# Patient Record
Sex: MALE | Race: WHITE | NOT HISPANIC OR LATINO | URBAN - METROPOLITAN AREA
[De-identification: names, ages, dates, MRNs, and addresses within clinical notes are randomized per-mention and may not be internally consistent; named-entity substitution may affect disease eponyms.]

---

## 2017-03-14 ENCOUNTER — HISTORICAL (OUTPATIENT)
Dept: ADMINISTRATIVE | Facility: HOSPITAL | Age: 67
End: 2017-03-14

## 2017-04-11 ENCOUNTER — HISTORICAL (OUTPATIENT)
Dept: INTENSIVE CARE | Facility: HOSPITAL | Age: 67
End: 2017-04-11

## 2017-08-10 ENCOUNTER — HISTORICAL (OUTPATIENT)
Dept: INTENSIVE CARE | Facility: HOSPITAL | Age: 67
End: 2017-08-10

## 2018-03-09 ENCOUNTER — HISTORICAL (OUTPATIENT)
Dept: ADMINISTRATIVE | Facility: HOSPITAL | Age: 68
End: 2018-03-09

## 2018-09-06 ENCOUNTER — HISTORICAL (OUTPATIENT)
Dept: ADMINISTRATIVE | Facility: HOSPITAL | Age: 68
End: 2018-09-06

## 2018-09-06 LAB
ALBUMIN SERPL-MCNC: 4.2 GM/DL (ref 3.4–5)
ALBUMIN/GLOB SERPL: 1.45 {RATIO} (ref 1.5–2.5)
ALP SERPL-CCNC: 99 UNIT/L (ref 38–126)
ALT SERPL-CCNC: 20 UNIT/L (ref 7–52)
AST SERPL-CCNC: 27 UNIT/L (ref 15–37)
BILIRUB SERPL-MCNC: 0.4 MG/DL (ref 0.2–1)
BILIRUBIN DIRECT+TOT PNL SERPL-MCNC: 0.1 MG/DL (ref 0–0.5)
BILIRUBIN DIRECT+TOT PNL SERPL-MCNC: 0.3 MG/DL
BUN SERPL-MCNC: 10 MG/DL (ref 7–18)
CALCIUM SERPL-MCNC: 8.9 MG/DL (ref 8.5–10)
CHLORIDE SERPL-SCNC: 92 MMOL/L (ref 98–107)
CO2 SERPL-SCNC: 30 MMOL/L (ref 21–32)
CREAT SERPL-MCNC: 0.69 MG/DL (ref 0.6–1.3)
DEPRECATED CALCIDIOL+CALCIFEROL SERPL-MC: 61.6 NG/ML (ref 30–80)
GLOBULIN SER-MCNC: 2.9 GM/DL (ref 1.2–3)
GLUCOSE SERPL-MCNC: 91 MG/DL (ref 74–106)
POTASSIUM SERPL-SCNC: 4.3 MMOL/L (ref 3.5–5.1)
PROT SERPL-MCNC: 7.1 GM/DL (ref 6.4–8.2)
SODIUM SERPL-SCNC: 129 MMOL/L (ref 136–145)

## 2018-09-19 ENCOUNTER — HISTORICAL (OUTPATIENT)
Dept: ADMINISTRATIVE | Facility: HOSPITAL | Age: 68
End: 2018-09-19

## 2018-09-19 LAB
BUN SERPL-MCNC: 11 MG/DL (ref 7–18)
CALCIUM SERPL-MCNC: 9.2 MG/DL (ref 8.5–10)
CHLORIDE SERPL-SCNC: 89 MMOL/L (ref 98–107)
CO2 SERPL-SCNC: 30 MMOL/L (ref 21–32)
CREAT SERPL-MCNC: 0.66 MG/DL (ref 0.6–1.3)
CREAT/UREA NIT SERPL: 16.7
GLUCOSE SERPL-MCNC: 91 MG/DL (ref 74–106)
POTASSIUM SERPL-SCNC: 4.4 MMOL/L (ref 3.5–5.1)
SODIUM SERPL-SCNC: 126 MMOL/L (ref 136–145)

## 2018-09-28 ENCOUNTER — HISTORICAL (OUTPATIENT)
Dept: ADMINISTRATIVE | Facility: HOSPITAL | Age: 68
End: 2018-09-28

## 2018-09-28 ENCOUNTER — HISTORICAL (OUTPATIENT)
Dept: LAB | Facility: HOSPITAL | Age: 68
End: 2018-09-28

## 2018-09-28 LAB
ALBUMIN SERPL-MCNC: 4.1 GM/DL (ref 3.4–5)
ALBUMIN/GLOB SERPL: 1.52 {RATIO} (ref 1.5–2.5)
ALP SERPL-CCNC: 94 UNIT/L (ref 38–126)
ALT SERPL-CCNC: 17 UNIT/L (ref 7–52)
AST SERPL-CCNC: 26 UNIT/L (ref 15–37)
BILIRUB SERPL-MCNC: 0.3 MG/DL (ref 0.2–1)
BILIRUBIN DIRECT+TOT PNL SERPL-MCNC: 0.1 MG/DL (ref 0–0.5)
BILIRUBIN DIRECT+TOT PNL SERPL-MCNC: 0.2 MG/DL
BUN SERPL-MCNC: 16 MG/DL (ref 7–18)
CALCIUM SERPL-MCNC: 9.2 MG/DL (ref 8.5–10)
CHLORIDE SERPL-SCNC: 96 MMOL/L (ref 98–107)
CO2 SERPL-SCNC: 27 MMOL/L (ref 21–32)
CREAT SERPL-MCNC: 0.75 MG/DL (ref 0.6–1.3)
GLOBULIN SER-MCNC: 2.7 GM/DL (ref 1.2–3)
GLUCOSE SERPL-MCNC: 84 MG/DL (ref 74–106)
OSMOLALITY SERPL: 286 MOSM/KG (ref 280–300)
OSMOLALITY UR: 819 MOSM/KG (ref 300–1300)
POTASSIUM SERPL-SCNC: 4.5 MMOL/L (ref 3.5–5.1)
PROT SERPL-MCNC: 6.8 GM/DL (ref 6.4–8.2)
SODIUM SERPL-SCNC: 137 MMOL/L (ref 136–145)
SODIUM UR-SCNC: 98 MMOL/L

## 2019-03-22 ENCOUNTER — HISTORICAL (OUTPATIENT)
Dept: LAB | Facility: HOSPITAL | Age: 69
End: 2019-03-22

## 2019-03-22 LAB
ABS NEUT (OLG): 3.16 X10(3)/MCL (ref 2.1–9.2)
ALBUMIN SERPL-MCNC: 3.5 GM/DL (ref 3.4–5)
ALBUMIN/GLOB SERPL: 1.2 RATIO (ref 1.1–2)
ALP SERPL-CCNC: 117 UNIT/L (ref 50–136)
ALT SERPL-CCNC: 28 UNIT/L (ref 12–78)
APPEARANCE, UA: NORMAL
AST SERPL-CCNC: 31 UNIT/L (ref 15–37)
BACTERIA #/AREA URNS AUTO: NORMAL /HPF
BASOPHILS NFR BLD MANUAL: 4 % (ref 0–2)
BILIRUB SERPL-MCNC: 0.2 MG/DL (ref 0.2–1)
BILIRUB UR QL STRIP: NEGATIVE
BILIRUBIN DIRECT+TOT PNL SERPL-MCNC: 0.1 MG/DL (ref 0–0.5)
BILIRUBIN DIRECT+TOT PNL SERPL-MCNC: 0.1 MG/DL (ref 0–0.8)
BUN SERPL-MCNC: 13 MG/DL (ref 7–18)
CALCIUM SERPL-MCNC: 8.5 MG/DL (ref 8.5–10.1)
CHLORIDE SERPL-SCNC: 99 MMOL/L (ref 98–107)
CHOLEST SERPL-MCNC: 187 MG/DL (ref 0–200)
CHOLEST/HDLC SERPL: 2.2 {RATIO} (ref 0–5)
CO2 SERPL-SCNC: 33 MMOL/L (ref 21–32)
COLOR UR: NORMAL
CREAT SERPL-MCNC: 0.66 MG/DL (ref 0.7–1.3)
DEPRECATED CALCIDIOL+CALCIFEROL SERPL-MC: 59.51 NG/ML (ref 30–80)
EOSINOPHIL NFR BLD MANUAL: 2 % (ref 0–8)
ERYTHROCYTE [DISTWIDTH] IN BLOOD BY AUTOMATED COUNT: 13.5 % (ref 11.5–17)
GLOBULIN SER-MCNC: 3 GM/DL (ref 2.4–3.5)
GLUCOSE (UA): NEGATIVE
GLUCOSE SERPL-MCNC: 71 MG/DL (ref 74–106)
HCT VFR BLD AUTO: 39.3 % (ref 42–52)
HDLC SERPL-MCNC: 84 MG/DL (ref 35–60)
HGB BLD-MCNC: 12.6 GM/DL (ref 14–18)
HGB UR QL STRIP: NEGATIVE
KETONES UR QL STRIP: NEGATIVE
LDLC SERPL CALC-MCNC: 83 MG/DL (ref 0–129)
LEUKOCYTE ESTERASE UR QL STRIP: NEGATIVE
LYMPHOCYTES NFR BLD MANUAL: 16 % (ref 13–40)
MCH RBC QN AUTO: 30 PG (ref 27–31)
MCHC RBC AUTO-ENTMCNC: 32.1 GM/DL (ref 33–36)
MCV RBC AUTO: 93.6 FL (ref 80–94)
MONOCYTES NFR BLD MANUAL: 10 % (ref 2–11)
NEUTROPHILS NFR BLD MANUAL: 68 % (ref 47–80)
NITRITE UR QL STRIP.AUTO: NEGATIVE
PH UR STRIP: 8.5 [PH] (ref 5–9)
PLATELET # BLD AUTO: 297 X10(3)/MCL (ref 130–400)
PLATELET # BLD EST: NORMAL 10*3/UL
PMV BLD AUTO: 10.4 FL (ref 7.4–10.4)
POTASSIUM SERPL-SCNC: 4.4 MMOL/L (ref 3.5–5.1)
PROT SERPL-MCNC: 6.5 GM/DL (ref 6.4–8.2)
PROT UR QL STRIP: NEGATIVE
PSA SERPL-MCNC: 4.4 NG/ML (ref 0–4)
RBC # BLD AUTO: 4.2 X10(6)/MCL (ref 4.7–6.1)
RBC #/AREA URNS HPF: NORMAL /[HPF]
SODIUM SERPL-SCNC: 137 MMOL/L (ref 136–145)
SP GR UR STRIP: 1.02 (ref 1–1.03)
SQUAMOUS EPITHELIAL, UA: NORMAL
TRIGL SERPL-MCNC: 102 MG/DL (ref 30–150)
TSH SERPL-ACNC: 1.45 MIU/L (ref 0.36–3.74)
UROBILINOGEN UR STRIP-ACNC: 1
VLDLC SERPL CALC-MCNC: 20 MG/DL
WBC # SPEC AUTO: 4.9 X10(3)/MCL (ref 4.5–11.5)
WBC #/AREA URNS AUTO: NORMAL /[HPF]

## 2019-04-25 ENCOUNTER — HISTORICAL (OUTPATIENT)
Dept: ADMINISTRATIVE | Facility: HOSPITAL | Age: 69
End: 2019-04-25

## 2019-04-25 LAB
APPEARANCE, UA: ABNORMAL
BACTERIA #/AREA URNS AUTO: ABNORMAL /HPF
BILIRUB UR QL STRIP: NEGATIVE MG/DL
COLOR UR: YELLOW
GLUCOSE (UA): NEGATIVE MG/DL
HGB UR QL STRIP: NEGATIVE UNIT/L
KETONES UR QL STRIP: NEGATIVE MG/DL
LEUKOCYTE ESTERASE UR QL STRIP: NEGATIVE UNIT/L
NITRITE UR QL STRIP.AUTO: NEGATIVE
PH UR STRIP: 8.5 [PH]
PROT UR QL STRIP: NEGATIVE MG/DL
PSA SERPL-MCNC: 3.15 NG/ML (ref 0–4.5)
RBC #/AREA URNS HPF: ABNORMAL /HPF
SP GR UR STRIP: 1.01
SQUAMOUS EPITHELIAL, UA: ABNORMAL /LPF
UROBILINOGEN UR STRIP-ACNC: 0.2 MG/DL
WBC #/AREA URNS AUTO: ABNORMAL /[HPF]

## 2019-07-10 ENCOUNTER — HISTORICAL (OUTPATIENT)
Dept: ADMINISTRATIVE | Facility: HOSPITAL | Age: 69
End: 2019-07-10

## 2019-07-10 ENCOUNTER — HISTORICAL (OUTPATIENT)
Dept: LAB | Facility: HOSPITAL | Age: 69
End: 2019-07-10

## 2019-07-10 LAB
ALBUMIN SERPL-MCNC: 3.9 GM/DL (ref 3.4–5)
ALBUMIN/GLOB SERPL: 1.44 {RATIO} (ref 1.5–2.5)
ALP SERPL-CCNC: 84 UNIT/L (ref 38–126)
ALT SERPL-CCNC: 18 UNIT/L (ref 7–52)
AST SERPL-CCNC: 28 UNIT/L (ref 15–37)
BILIRUB SERPL-MCNC: 0.3 MG/DL (ref 0.2–1)
BILIRUBIN DIRECT+TOT PNL SERPL-MCNC: 0.1 MG/DL
BILIRUBIN DIRECT+TOT PNL SERPL-MCNC: 0.2 MG/DL (ref 0–0.5)
BUN SERPL-MCNC: 13 MG/DL (ref 7–18)
CALCIUM SERPL-MCNC: 8.9 MG/DL (ref 8.5–10)
CHLORIDE SERPL-SCNC: 99 MMOL/L (ref 98–107)
CO2 SERPL-SCNC: 29 MMOL/L (ref 21–32)
CREAT SERPL-MCNC: 0.7 MG/DL (ref 0.6–1.3)
GLOBULIN SER-MCNC: 2.7 GM/DL (ref 1.2–3)
GLUCOSE SERPL-MCNC: 80 MG/DL (ref 74–106)
POTASSIUM SERPL-SCNC: 4.3 MMOL/L (ref 3.5–5.1)
PROT SERPL-MCNC: 6.6 GM/DL (ref 6.4–8.2)
SODIUM SERPL-SCNC: 136 MMOL/L (ref 136–145)

## 2019-08-28 ENCOUNTER — HISTORICAL (OUTPATIENT)
Dept: ADMINISTRATIVE | Facility: HOSPITAL | Age: 69
End: 2019-08-28

## 2019-08-28 LAB
BUN SERPL-MCNC: 14 MG/DL (ref 7–18)
CALCIUM SERPL-MCNC: 8.8 MG/DL (ref 8.5–10.1)
CHLORIDE SERPL-SCNC: 98 MMOL/L (ref 98–107)
CO2 SERPL-SCNC: 33 MMOL/L (ref 21–32)
CREAT SERPL-MCNC: 0.64 MG/DL (ref 0.7–1.3)
CREAT/UREA NIT SERPL: 21.9
ERYTHROCYTE [DISTWIDTH] IN BLOOD BY AUTOMATED COUNT: 13.3 % (ref 11.5–17)
GLUCOSE SERPL-MCNC: 78 MG/DL (ref 74–106)
HCT VFR BLD AUTO: 35.8 % (ref 42–52)
HGB BLD-MCNC: 11.7 GM/DL (ref 14–18)
MCH RBC QN AUTO: 30.7 PG (ref 27–31)
MCHC RBC AUTO-ENTMCNC: 32.7 GM/DL (ref 33–36)
MCV RBC AUTO: 94 FL (ref 80–94)
PLATELET # BLD AUTO: 312 X10(3)/MCL (ref 130–400)
PMV BLD AUTO: 9.4 FL (ref 9.4–12.4)
POTASSIUM SERPL-SCNC: 4.1 MMOL/L (ref 3.5–5.1)
RBC # BLD AUTO: 3.81 X10(6)/MCL (ref 4.7–6.1)
SODIUM SERPL-SCNC: 136 MMOL/L (ref 136–145)
WBC # SPEC AUTO: 5 X10(3)/MCL (ref 4.5–11.5)

## 2019-12-10 ENCOUNTER — HISTORICAL (OUTPATIENT)
Dept: RADIOLOGY | Facility: HOSPITAL | Age: 69
End: 2019-12-10

## 2020-02-05 ENCOUNTER — HISTORICAL (OUTPATIENT)
Dept: LAB | Facility: HOSPITAL | Age: 70
End: 2020-02-05

## 2020-02-05 LAB
ABS NEUT (OLG): 3.64 X10(3)/MCL (ref 2.1–9.2)
ALBUMIN SERPL-MCNC: 3.4 GM/DL (ref 3.4–5)
ALBUMIN/GLOB SERPL: 1.1 {RATIO}
ALP SERPL-CCNC: 102 UNIT/L (ref 50–136)
ALT SERPL-CCNC: 32 UNIT/L (ref 12–78)
AST SERPL-CCNC: 27 UNIT/L (ref 15–37)
BASOPHILS NFR BLD MANUAL: 1 % (ref 0–2)
BILIRUB SERPL-MCNC: 0.2 MG/DL (ref 0.2–1)
BILIRUBIN DIRECT+TOT PNL SERPL-MCNC: 0.1 MG/DL (ref 0–0.2)
BILIRUBIN DIRECT+TOT PNL SERPL-MCNC: 0.1 MG/DL (ref 0–0.8)
BUN SERPL-MCNC: 15 MG/DL (ref 7–18)
CALCIUM SERPL-MCNC: 8.7 MG/DL (ref 8.5–10.1)
CHLORIDE SERPL-SCNC: 96 MMOL/L (ref 98–107)
CO2 SERPL-SCNC: 32 MMOL/L (ref 21–32)
CREAT SERPL-MCNC: 0.62 MG/DL (ref 0.7–1.3)
ERYTHROCYTE [DISTWIDTH] IN BLOOD BY AUTOMATED COUNT: 13.4 % (ref 11.5–17)
GLOBULIN SER-MCNC: 3 GM/DL (ref 2.4–3.5)
GLUCOSE SERPL-MCNC: 68 MG/DL (ref 74–106)
HCT VFR BLD AUTO: 38 % (ref 42–52)
HGB BLD-MCNC: 11.9 GM/DL (ref 14–18)
LYMPHOCYTES NFR BLD MANUAL: 19 % (ref 13–40)
MCH RBC QN AUTO: 29.9 PG (ref 27–31)
MCHC RBC AUTO-ENTMCNC: 31.3 GM/DL (ref 33–36)
MCV RBC AUTO: 95.5 FL (ref 80–94)
MONOCYTES NFR BLD MANUAL: 10 % (ref 2–11)
NEUTROPHILS NFR BLD MANUAL: 70 % (ref 47–80)
PLATELET # BLD AUTO: 283 X10(3)/MCL (ref 130–400)
PLATELET # BLD EST: NORMAL 10*3/UL
PMV BLD AUTO: 10 FL (ref 7.4–10.4)
POTASSIUM SERPL-SCNC: 4.3 MMOL/L (ref 3.5–5.1)
PROT SERPL-MCNC: 6.4 GM/DL (ref 6.4–8.2)
RBC # BLD AUTO: 3.98 X10(6)/MCL (ref 4.7–6.1)
RBC MORPH BLD: NORMAL
SODIUM SERPL-SCNC: 132 MMOL/L (ref 136–145)
WBC # SPEC AUTO: 5.6 X10(3)/MCL (ref 4.5–11.5)

## 2020-10-01 LAB
APTT PPP: 36.3 SECOND(S) (ref 23.2–33.7)
BUN SERPL-MCNC: 28 MG/DL (ref 7–18)
CALCIUM SERPL-MCNC: 8.2 MG/DL (ref 8.5–10.1)
CHLORIDE SERPL-SCNC: 97 MMOL/L (ref 98–107)
CO2 SERPL-SCNC: 32.3 MMOL/L (ref 21–32)
CREAT SERPL-MCNC: 0.6 MG/DL (ref 0.6–1.3)
CREAT/UREA NIT SERPL: 47 MG/DL (ref 12–14)
EST. AVERAGE GLUCOSE BLD GHB EST-MCNC: 94 MG/DL
GLUCOSE SERPL-MCNC: 95 MG/DL (ref 74–106)
HBA1C MFR BLD: 4.9 % (ref 4.5–6.2)
INR PPP: 1.2 (ref 0–1.3)
POTASSIUM SERPL-SCNC: 4.5 MMOL/L (ref 3.5–5.1)
PREALB SERPL-MCNC: 25.2 MG/DL (ref 18–35.7)
PROTHROMBIN TIME: 14.8 SECOND(S) (ref 11.1–13.7)
SODIUM SERPL-SCNC: 132 MMOL/L (ref 136–145)

## 2020-10-03 ENCOUNTER — HISTORICAL (OUTPATIENT)
Dept: ADMINISTRATIVE | Facility: HOSPITAL | Age: 70
End: 2020-10-03

## 2020-10-03 LAB
ABS NEUT (OLG): 3.36 X10(3)/MCL (ref 2.1–9.2)
ALBUMIN SERPL-MCNC: 2.9 GM/DL (ref 3.4–5)
ALBUMIN/GLOB SERPL: 1.1 RATIO (ref 1.1–2)
ALP SERPL-CCNC: 101 UNIT/L (ref 46–116)
ALT SERPL-CCNC: 47 UNIT/L (ref 12–78)
AST SERPL-CCNC: 35 UNIT/L (ref 15–37)
BILIRUB SERPL-MCNC: 0.4 MG/DL (ref 0.2–1)
BILIRUBIN DIRECT+TOT PNL SERPL-MCNC: 0.19 MG/DL (ref 0–0.2)
BILIRUBIN DIRECT+TOT PNL SERPL-MCNC: 0.21 MG/DL (ref 0–0.8)
BUN SERPL-MCNC: 26.2 MG/DL (ref 7–18)
CALCIUM SERPL-MCNC: 8.5 MG/DL (ref 8.5–10.1)
CHLORIDE SERPL-SCNC: 96 MMOL/L (ref 98–107)
CO2 SERPL-SCNC: 28.2 MMOL/L (ref 21–32)
CREAT SERPL-MCNC: 0.48 MG/DL (ref 0.6–1.3)
EOSINOPHIL # BLD AUTO: 0 X10(3)/MCL (ref 0–0.9)
EOSINOPHIL NFR BLD AUTO: 0 %
ERYTHROCYTE [DISTWIDTH] IN BLOOD BY AUTOMATED COUNT: 11.9 % (ref 11.5–17)
GLOBULIN SER-MCNC: 2.7 GM/DL (ref 2.4–3.5)
GLUCOSE SERPL-MCNC: 85 MG/DL (ref 74–106)
HCT VFR BLD AUTO: 30.7 % (ref 42–52)
HGB BLD-MCNC: 11.1 GM/DL (ref 14–18)
IMM GRANULOCYTES # BLD AUTO: 0.07 % (ref 0–0.02)
IMM GRANULOCYTES NFR BLD AUTO: 1.4 % (ref 0–0.43)
LYMPHOCYTES # BLD AUTO: 0.9 X10(3)/MCL (ref 0.6–4.6)
LYMPHOCYTES NFR BLD AUTO: 18 %
MCH RBC QN AUTO: 32.4 PG (ref 27–31)
MCHC RBC AUTO-ENTMCNC: 36.2 GM/DL (ref 33–36)
MCV RBC AUTO: 89.5 FL (ref 80–94)
MONOCYTES # BLD AUTO: 0.8 X10(3)/MCL (ref 0.1–1.3)
MONOCYTES NFR BLD AUTO: 16 %
NEUTROPHILS # BLD AUTO: 3.36 X10(3)/MCL (ref 1.4–7.9)
NEUTROPHILS NFR BLD AUTO: 65 %
PLATELET # BLD AUTO: 266 X10(3)/MCL (ref 130–400)
PMV BLD AUTO: 9.7 FL (ref 9.4–12.4)
POTASSIUM SERPL-SCNC: 4.7 MMOL/L (ref 3.5–5.1)
PROT SERPL-MCNC: 5.6 GM/DL (ref 6.4–8.2)
RBC # BLD AUTO: 3.43 X10(6)/MCL (ref 4.7–6.1)
SODIUM SERPL-SCNC: 130 MMOL/L (ref 136–145)
WBC # SPEC AUTO: 5.2 X10(3)/MCL (ref 4.5–11.5)

## 2020-10-07 LAB
BUN SERPL-MCNC: 23.4 MG/DL (ref 7–18)
CALCIUM SERPL-MCNC: 8.4 MG/DL (ref 8.5–10.1)
CHLORIDE SERPL-SCNC: 96 MMOL/L (ref 98–107)
CO2 SERPL-SCNC: 30 MMOL/L (ref 21–32)
CREAT SERPL-MCNC: 0.55 MG/DL (ref 0.6–1.3)
CREAT/UREA NIT SERPL: 43 MG/DL (ref 12–14)
GLUCOSE SERPL-MCNC: 87 MG/DL (ref 74–106)
POTASSIUM SERPL-SCNC: 4.2 MMOL/L (ref 3.5–5.1)
SODIUM SERPL-SCNC: 131 MMOL/L (ref 136–145)

## 2020-10-10 LAB
ALBUMIN SERPL-MCNC: 2.7 GM/DL (ref 3.4–5)
ALBUMIN/GLOB SERPL: 0.9 RATIO (ref 1.1–2)
ALP SERPL-CCNC: 118 UNIT/L (ref 46–116)
ALT SERPL-CCNC: 36 UNIT/L (ref 12–78)
AST SERPL-CCNC: 24 UNIT/L (ref 15–37)
BILIRUB SERPL-MCNC: 0.4 MG/DL (ref 0.2–1)
BILIRUBIN DIRECT+TOT PNL SERPL-MCNC: 0.15 MG/DL (ref 0–0.2)
BILIRUBIN DIRECT+TOT PNL SERPL-MCNC: 0.25 MG/DL (ref 0–0.8)
BUN SERPL-MCNC: 18 MG/DL (ref 7–18)
CALCIUM SERPL-MCNC: 7.8 MG/DL (ref 8.5–10.1)
CHLORIDE SERPL-SCNC: 98 MMOL/L (ref 98–107)
CO2 SERPL-SCNC: 29.9 MMOL/L (ref 21–32)
CREAT SERPL-MCNC: 0.57 MG/DL (ref 0.6–1.3)
GLOBULIN SER-MCNC: 2.9 GM/DL (ref 2.4–3.5)
GLUCOSE SERPL-MCNC: 118 MG/DL (ref 74–106)
MAGNESIUM SERPL-MCNC: 1.7 MG/DL (ref 1.8–2.4)
POTASSIUM SERPL-SCNC: 4.1 MMOL/L (ref 3.5–5.1)
PROT SERPL-MCNC: 5.6 GM/DL (ref 6.4–8.2)
SODIUM SERPL-SCNC: 134 MMOL/L (ref 136–145)

## 2020-10-12 LAB
BUN SERPL-MCNC: 22.6 MG/DL (ref 7–18)
CALCIUM SERPL-MCNC: 8.2 MG/DL (ref 8.5–10.1)
CHLORIDE SERPL-SCNC: 98 MMOL/L (ref 98–107)
CO2 SERPL-SCNC: 29.3 MMOL/L (ref 21–32)
CREAT SERPL-MCNC: 0.57 MG/DL (ref 0.6–1.3)
CREAT/UREA NIT SERPL: 40 MG/DL (ref 12–14)
GLUCOSE SERPL-MCNC: 98 MG/DL (ref 74–106)
MAGNESIUM SERPL-MCNC: 1.7 MG/DL (ref 1.8–2.4)
POTASSIUM SERPL-SCNC: 4.2 MMOL/L (ref 3.5–5.1)
PREALB SERPL-MCNC: 14.5 MG/DL (ref 18–35.7)
SODIUM SERPL-SCNC: 133 MMOL/L (ref 136–145)

## 2020-10-15 LAB
BUN SERPL-MCNC: 23.4 MG/DL (ref 7–18)
CALCIUM SERPL-MCNC: 8.2 MG/DL (ref 8.5–10.1)
CHLORIDE SERPL-SCNC: 92 MMOL/L (ref 98–107)
CO2 SERPL-SCNC: 32.3 MMOL/L (ref 21–32)
CREAT SERPL-MCNC: 0.61 MG/DL (ref 0.6–1.3)
CREAT/UREA NIT SERPL: 38 MG/DL (ref 12–14)
GLUCOSE SERPL-MCNC: 98 MG/DL (ref 74–106)
MAGNESIUM SERPL-MCNC: 1.8 MG/DL (ref 1.8–2.4)
PHOSPHATE SERPL-MCNC: 0.5 MG/DL (ref 2.5–4.9)
POTASSIUM SERPL-SCNC: 4.5 MMOL/L (ref 3.5–5.1)
SODIUM SERPL-SCNC: 127 MMOL/L (ref 136–145)

## 2020-10-16 LAB
BUN SERPL-MCNC: 22.8 MG/DL (ref 7–18)
CALCIUM SERPL-MCNC: 8 MG/DL (ref 8.5–10.1)
CHLORIDE SERPL-SCNC: 95 MMOL/L (ref 98–107)
CO2 SERPL-SCNC: 31.3 MMOL/L (ref 21–32)
CREAT SERPL-MCNC: 0.62 MG/DL (ref 0.6–1.3)
CREAT/UREA NIT SERPL: 37 MG/DL (ref 12–14)
GLUCOSE SERPL-MCNC: 99 MG/DL (ref 74–106)
MAGNESIUM SERPL-MCNC: 1.7 MG/DL (ref 1.8–2.4)
PHOSPHATE SERPL-MCNC: 2.5 MG/DL (ref 2.5–4.9)
POTASSIUM SERPL-SCNC: 4.1 MMOL/L (ref 3.5–5.1)
SODIUM SERPL-SCNC: 130 MMOL/L (ref 136–145)

## 2020-10-20 LAB
COLOR STL: ABNORMAL
CONSISTENCY STL: ABNORMAL
HEMOCCULT SP2 STL QL: POSITIVE

## 2020-10-21 LAB
ABS NEUT (OLG): 4.77 X10(3)/MCL (ref 2.1–9.2)
BASOPHILS # BLD AUTO: 0 X10(3)/MCL (ref 0–0.2)
BASOPHILS NFR BLD AUTO: 0 %
BUN SERPL-MCNC: 22 MG/DL (ref 7–18)
CALCIUM SERPL-MCNC: 7.8 MG/DL (ref 8.5–10.1)
CANCER AG19-9 SERPL-ACNC: 5.4 UNIT/ML (ref 0–37)
CEA SERPL-MCNC: 2.06 NG/ML (ref 0–3)
CHLORIDE SERPL-SCNC: 101 MMOL/L (ref 98–107)
CO2 SERPL-SCNC: 29.7 MMOL/L (ref 21–32)
CORTIS SERPL-SCNC: 2.1 UG/DL
CREAT SERPL-MCNC: 0.59 MG/DL (ref 0.6–1.3)
CREAT/UREA NIT SERPL: 37 MG/DL (ref 12–14)
EOSINOPHIL # BLD AUTO: 0 X10(3)/MCL (ref 0–0.9)
EOSINOPHIL NFR BLD AUTO: 1 %
ERYTHROCYTE [DISTWIDTH] IN BLOOD BY AUTOMATED COUNT: 17 % (ref 11.5–17)
FT4I SERPL CALC-MCNC: 1.56
GLUCOSE SERPL-MCNC: 95 MG/DL (ref 74–106)
HAV IGM SERPL QL IA: NONREACTIVE
HBV CORE IGM SERPL QL IA: NONREACTIVE
HBV SURFACE AG SERPL QL IA: NONREACTIVE
HCT VFR BLD AUTO: 33.5 % (ref 42–52)
HCV AB SERPL QL IA: NONREACTIVE
HEPATITIS PANEL INTERP: NORMAL
HGB BLD-MCNC: 10.7 GM/DL (ref 14–18)
HIV 1+2 AB+HIV1 P24 AG SERPL QL IA: NONREACTIVE
IMM GRANULOCYTES # BLD AUTO: 0.07 % (ref 0–0.02)
IMM GRANULOCYTES NFR BLD AUTO: 1.1 % (ref 0–0.43)
LYMPHOCYTES # BLD AUTO: 0.8 X10(3)/MCL (ref 0.6–4.6)
LYMPHOCYTES NFR BLD AUTO: 12 %
MCH RBC QN AUTO: 32.6 PG (ref 27–31)
MCHC RBC AUTO-ENTMCNC: 31.9 GM/DL (ref 33–36)
MCV RBC AUTO: 102.1 FL (ref 80–94)
MONOCYTES # BLD AUTO: 0.7 X10(3)/MCL (ref 0.1–1.3)
MONOCYTES NFR BLD AUTO: 12 %
NEUTROPHILS # BLD AUTO: 4.77 X10(3)/MCL (ref 1.4–7.9)
NEUTROPHILS NFR BLD AUTO: 74 %
PLATELET # BLD AUTO: 478 X10(3)/MCL (ref 130–400)
PMV BLD AUTO: 8.3 FL (ref 9.4–12.4)
POTASSIUM SERPL-SCNC: 4.8 MMOL/L (ref 3.5–5.1)
RBC # BLD AUTO: 3.28 X10(6)/MCL (ref 4.7–6.1)
SODIUM SERPL-SCNC: 137 MMOL/L (ref 136–145)
T3RU NFR SERPL: 38 % (ref 31–39)
T4 SERPL-MCNC: 4.1 MCG/DL (ref 4.7–13.3)
TSH SERPL-ACNC: 1.03 MIU/ML (ref 0.36–3.74)
WBC # SPEC AUTO: 6.4 X10(3)/MCL (ref 4.5–11.5)

## 2020-10-22 LAB
ABS NEUT (OLG): 3.87 X10(3)/MCL (ref 2.1–9.2)
ALBUMIN SERPL-MCNC: 2.66 GM/DL (ref 3.4–5)
ALBUMIN/GLOB SERPL: 1.1 RATIO (ref 1.1–2)
ALP SERPL-CCNC: 92 UNIT/L (ref 46–116)
ALT SERPL-CCNC: 68 UNIT/L (ref 12–78)
AST SERPL-CCNC: 27 UNIT/L (ref 15–37)
BASOPHILS # BLD AUTO: 0 X10(3)/MCL (ref 0–0.2)
BASOPHILS NFR BLD AUTO: 1 %
BILIRUB SERPL-MCNC: 0.3 MG/DL (ref 0.2–1)
BILIRUBIN DIRECT+TOT PNL SERPL-MCNC: 0.14 MG/DL (ref 0–0.8)
BILIRUBIN DIRECT+TOT PNL SERPL-MCNC: 0.16 MG/DL (ref 0–0.2)
BUN SERPL-MCNC: 20.7 MG/DL (ref 7–18)
CALCIUM SERPL-MCNC: 8 MG/DL (ref 8.5–10.1)
CHLORIDE SERPL-SCNC: 99 MMOL/L (ref 98–107)
CO2 SERPL-SCNC: 29.2 MMOL/L (ref 21–32)
COLOR STL: NORMAL
CONSISTENCY STL: NORMAL
CORTIS 1H P CHAL SERPL-SCNC: 11.9 UG/DL
CORTIS 30M P CHAL SERPL-SCNC: 8.5 UG/DL
CORTIS SERPL-SCNC: 1.7 UG/DL
CREAT SERPL-MCNC: 0.69 MG/DL (ref 0.6–1.3)
EOSINOPHIL # BLD AUTO: 0.1 X10(3)/MCL (ref 0–0.9)
EOSINOPHIL NFR BLD AUTO: 1 %
ERYTHROCYTE [DISTWIDTH] IN BLOOD BY AUTOMATED COUNT: 16.8 % (ref 11.5–17)
GLOBULIN SER-MCNC: 2.34 GM/DL (ref 2.4–3.5)
GLUCOSE SERPL-MCNC: 104 MG/DL (ref 74–106)
HCT VFR BLD AUTO: 32.2 % (ref 42–52)
HGB BLD-MCNC: 10.5 GM/DL (ref 14–18)
IMM GRANULOCYTES # BLD AUTO: 0.06 % (ref 0–0.02)
IMM GRANULOCYTES NFR BLD AUTO: 1.1 % (ref 0–0.43)
LYMPHOCYTES # BLD AUTO: 0.8 X10(3)/MCL (ref 0.6–4.6)
LYMPHOCYTES NFR BLD AUTO: 16 %
MAGNESIUM SERPL-MCNC: 1.8 MG/DL (ref 1.8–2.4)
MCH RBC QN AUTO: 33.1 PG (ref 27–31)
MCHC RBC AUTO-ENTMCNC: 32.6 GM/DL (ref 33–36)
MCV RBC AUTO: 101.6 FL (ref 80–94)
MONOCYTES # BLD AUTO: 0.6 X10(3)/MCL (ref 0.1–1.3)
MONOCYTES NFR BLD AUTO: 11 %
NEUTROPHILS # BLD AUTO: 3.87 X10(3)/MCL (ref 1.4–7.9)
NEUTROPHILS NFR BLD AUTO: 71 %
PHOSPHATE SERPL-MCNC: 1.3 MG/DL (ref 2.5–4.9)
PLATELET # BLD AUTO: 398 X10(3)/MCL (ref 130–400)
PMV BLD AUTO: 7.8 FL (ref 9.4–12.4)
POTASSIUM SERPL-SCNC: 4.4 MMOL/L (ref 3.5–5.1)
PROT SERPL-MCNC: 5 GM/DL (ref 6.4–8.2)
RBC # BLD AUTO: 3.17 X10(6)/MCL (ref 4.7–6.1)
SODIUM SERPL-SCNC: 132 MMOL/L (ref 136–145)
WBC # SPEC AUTO: 5.5 X10(3)/MCL (ref 4.5–11.5)

## 2020-10-29 LAB
ABS NEUT (OLG): 7.34 X10(3)/MCL (ref 2.1–9.2)
ALBUMIN SERPL-MCNC: 2.63 GM/DL (ref 3.4–5)
ALBUMIN/GLOB SERPL: 0.9 RATIO (ref 1.1–2)
ALP SERPL-CCNC: 107 UNIT/L (ref 46–116)
ALT SERPL-CCNC: 65 UNIT/L (ref 12–78)
AST SERPL-CCNC: 25 UNIT/L (ref 15–37)
BASOPHILS # BLD AUTO: 0 X10(3)/MCL (ref 0–0.2)
BASOPHILS NFR BLD AUTO: 0 %
BILIRUB SERPL-MCNC: 0.4 MG/DL (ref 0.2–1)
BILIRUBIN DIRECT+TOT PNL SERPL-MCNC: 0.18 MG/DL (ref 0–0.2)
BILIRUBIN DIRECT+TOT PNL SERPL-MCNC: 0.22 MG/DL (ref 0–0.8)
BUN SERPL-MCNC: 16.5 MG/DL (ref 7–18)
CALCIUM SERPL-MCNC: 8.8 MG/DL (ref 8.5–10.1)
CHLORIDE SERPL-SCNC: 101 MMOL/L (ref 98–107)
CO2 SERPL-SCNC: 32.3 MMOL/L (ref 21–32)
CREAT SERPL-MCNC: 0.61 MG/DL (ref 0.6–1.3)
EOSINOPHIL # BLD AUTO: 0.2 X10(3)/MCL (ref 0–0.9)
EOSINOPHIL NFR BLD AUTO: 2 %
ERYTHROCYTE [DISTWIDTH] IN BLOOD BY AUTOMATED COUNT: 16.3 % (ref 11.5–17)
GLOBULIN SER-MCNC: 2.77 GM/DL (ref 2.4–3.5)
GLUCOSE SERPL-MCNC: 84 MG/DL (ref 74–106)
HCT VFR BLD AUTO: 37.2 % (ref 42–52)
HGB BLD-MCNC: 11.8 GM/DL (ref 14–18)
IMM GRANULOCYTES # BLD AUTO: 0.03 % (ref 0–0.02)
IMM GRANULOCYTES NFR BLD AUTO: 0.3 % (ref 0–0.43)
LYMPHOCYTES # BLD AUTO: 0.7 X10(3)/MCL (ref 0.6–4.6)
LYMPHOCYTES NFR BLD AUTO: 8 %
MAGNESIUM SERPL-MCNC: 1.9 MG/DL (ref 1.8–2.4)
MCH RBC QN AUTO: 33 PG (ref 27–31)
MCHC RBC AUTO-ENTMCNC: 31.7 GM/DL (ref 33–36)
MCV RBC AUTO: 103.9 FL (ref 80–94)
MONOCYTES # BLD AUTO: 0.9 X10(3)/MCL (ref 0.1–1.3)
MONOCYTES NFR BLD AUTO: 10 %
NEUTROPHILS # BLD AUTO: 7.34 X10(3)/MCL (ref 1.4–7.9)
NEUTROPHILS NFR BLD AUTO: 79 %
PHOSPHATE SERPL-MCNC: 2 MG/DL (ref 2.5–4.9)
PLATELET # BLD AUTO: 436 X10(3)/MCL (ref 130–400)
PMV BLD AUTO: 8.5 FL (ref 9.4–12.4)
POTASSIUM SERPL-SCNC: 3.4 MMOL/L (ref 3.5–5.1)
PROT SERPL-MCNC: 5.4 GM/DL (ref 6.4–8.2)
RBC # BLD AUTO: 3.58 X10(6)/MCL (ref 4.7–6.1)
SODIUM SERPL-SCNC: 138 MMOL/L (ref 136–145)
WBC # SPEC AUTO: 9.3 X10(3)/MCL (ref 4.5–11.5)

## 2020-11-05 LAB
ABS NEUT (OLG): 4.52 X10(3)/MCL (ref 2.1–9.2)
ALBUMIN SERPL-MCNC: 3.04 GM/DL (ref 3.4–5)
ALBUMIN/GLOB SERPL: 1 RATIO (ref 1.1–2)
ALP SERPL-CCNC: 124 UNIT/L (ref 46–116)
ALT SERPL-CCNC: 90 UNIT/L (ref 12–78)
AST SERPL-CCNC: 23 UNIT/L (ref 15–37)
BASOPHILS # BLD AUTO: 0 X10(3)/MCL (ref 0–0.2)
BASOPHILS NFR BLD AUTO: 0 %
BILIRUB SERPL-MCNC: 0.3 MG/DL (ref 0.2–1)
BILIRUBIN DIRECT+TOT PNL SERPL-MCNC: 0.14 MG/DL (ref 0–0.2)
BILIRUBIN DIRECT+TOT PNL SERPL-MCNC: 0.16 MG/DL (ref 0–0.8)
BUN SERPL-MCNC: 22.1 MG/DL (ref 7–18)
CALCIUM SERPL-MCNC: 8.6 MG/DL (ref 8.5–10.1)
CHLORIDE SERPL-SCNC: 96 MMOL/L (ref 98–107)
CO2 SERPL-SCNC: 25.1 MMOL/L (ref 21–32)
CREAT SERPL-MCNC: 0.69 MG/DL (ref 0.6–1.3)
EOSINOPHIL # BLD AUTO: 0.1 X10(3)/MCL (ref 0–0.9)
EOSINOPHIL NFR BLD AUTO: 2 %
ERYTHROCYTE [DISTWIDTH] IN BLOOD BY AUTOMATED COUNT: 14.9 % (ref 11.5–17)
GLOBULIN SER-MCNC: 2.96 GM/DL (ref 2.4–3.5)
GLUCOSE SERPL-MCNC: 134 MG/DL (ref 74–106)
HCT VFR BLD AUTO: 39.8 % (ref 42–52)
HGB BLD-MCNC: 12.6 GM/DL (ref 14–18)
IMM GRANULOCYTES # BLD AUTO: 0.06 % (ref 0–0.02)
IMM GRANULOCYTES NFR BLD AUTO: 0.9 % (ref 0–0.43)
LYMPHOCYTES # BLD AUTO: 1.3 X10(3)/MCL (ref 0.6–4.6)
LYMPHOCYTES NFR BLD AUTO: 20 %
MAGNESIUM SERPL-MCNC: 1.9 MG/DL (ref 1.8–2.4)
MCH RBC QN AUTO: 32.1 PG (ref 27–31)
MCHC RBC AUTO-ENTMCNC: 31.7 GM/DL (ref 33–36)
MCV RBC AUTO: 101.3 FL (ref 80–94)
MONOCYTES # BLD AUTO: 0.8 X10(3)/MCL (ref 0.1–1.3)
MONOCYTES NFR BLD AUTO: 11 %
NEUTROPHILS # BLD AUTO: 4.52 X10(3)/MCL (ref 1.4–7.9)
NEUTROPHILS NFR BLD AUTO: 66 %
PHOSPHATE SERPL-MCNC: 3.3 MG/DL (ref 2.5–4.9)
PLATELET # BLD AUTO: 375 X10(3)/MCL (ref 130–400)
PMV BLD AUTO: 8.5 FL (ref 9.4–12.4)
POTASSIUM SERPL-SCNC: 4.1 MMOL/L (ref 3.5–5.1)
PROT SERPL-MCNC: 6 GM/DL (ref 6.4–8.2)
RBC # BLD AUTO: 3.93 X10(6)/MCL (ref 4.7–6.1)
SODIUM SERPL-SCNC: 130 MMOL/L (ref 136–145)
WBC # SPEC AUTO: 6.8 X10(3)/MCL (ref 4.5–11.5)

## 2020-11-19 ENCOUNTER — HISTORICAL (OUTPATIENT)
Dept: ADMINISTRATIVE | Facility: HOSPITAL | Age: 70
End: 2020-11-19

## 2020-11-19 LAB — SARS-COV-2 RNA RESP QL NAA+PROBE: NOT DETECTED

## 2020-12-03 ENCOUNTER — HISTORICAL (OUTPATIENT)
Dept: ADMINISTRATIVE | Facility: HOSPITAL | Age: 70
End: 2020-12-03

## 2020-12-03 LAB
ABS NEUT (OLG): 3.32 X10(3)/MCL (ref 2.1–9.2)
ALBUMIN SERPL-MCNC: 3.1 GM/DL (ref 3.4–4.8)
ALBUMIN/GLOB SERPL: 1.2 RATIO (ref 1.1–2)
ALP SERPL-CCNC: 193 UNIT/L (ref 40–150)
ALT SERPL-CCNC: 26 UNIT/L (ref 0–55)
AST SERPL-CCNC: 23 UNIT/L (ref 5–34)
BASOPHILS NFR BLD MANUAL: 0 % (ref 0–2)
BILIRUB SERPL-MCNC: 0.2 MG/DL (ref 0.2–1.2)
BILIRUBIN DIRECT+TOT PNL SERPL-MCNC: 0.1 MG/DL (ref 0–0.5)
BILIRUBIN DIRECT+TOT PNL SERPL-MCNC: 0.1 MG/DL (ref 0–0.8)
BUN SERPL-MCNC: 8.5 MG/DL (ref 8.4–25.7)
CALCIUM SERPL-MCNC: 9 MG/DL (ref 8.8–10)
CHLORIDE SERPL-SCNC: 95 MMOL/L (ref 98–107)
CHOLEST SERPL-MCNC: 154 MG/DL
CHOLEST/HDLC SERPL: 3 {RATIO} (ref 0–5)
CO2 SERPL-SCNC: 28 MMOL/L (ref 23–31)
CREAT SERPL-MCNC: 0.63 MG/DL (ref 0.72–1.25)
DEPRECATED CALCIDIOL+CALCIFEROL SERPL-MC: 47.8 NG/ML (ref 30–80)
EOSINOPHIL NFR BLD MANUAL: 2 % (ref 0–8)
ERYTHROCYTE [DISTWIDTH] IN BLOOD BY AUTOMATED COUNT: 12.6 % (ref 11.5–17)
GLOBULIN SER-MCNC: 2.5 GM/DL (ref 2.4–3.5)
GLUCOSE SERPL-MCNC: 74 MG/DL (ref 82–115)
GRANULOCYTES NFR BLD MANUAL: 56 % (ref 47–80)
HCT VFR BLD AUTO: 34.8 % (ref 42–52)
HDLC SERPL-MCNC: 60 MG/DL (ref 40–60)
HGB BLD-MCNC: 11.6 GM/DL (ref 14–18)
LDLC SERPL CALC-MCNC: 82 MG/DL (ref 50–140)
LYMPHOCYTES NFR BLD MANUAL: 29 % (ref 13–40)
MCH RBC QN AUTO: 31 PG (ref 27–31)
MCHC RBC AUTO-ENTMCNC: 33.3 GM/DL (ref 33–36)
MCV RBC AUTO: 93 FL (ref 80–94)
METAMYELOCYTES NFR BLD MANUAL: 1 %
MONOCYTES NFR BLD MANUAL: 12 % (ref 2–11)
PLATELET # BLD AUTO: 545 X10(3)/MCL (ref 130–400)
PLATELET # BLD EST: ABNORMAL 10*3/UL
PMV BLD AUTO: 8.7 FL (ref 7.4–10.4)
POTASSIUM SERPL-SCNC: 4.9 MMOL/L (ref 3.5–5.1)
PROT SERPL-MCNC: 5.6 GM/DL (ref 5.8–7.6)
RBC # BLD AUTO: 3.74 X10(6)/MCL (ref 4.7–6.1)
RBC MORPH BLD: NORMAL
SODIUM SERPL-SCNC: 130 MMOL/L (ref 136–145)
TRIGL SERPL-MCNC: 61 MG/DL (ref 0–150)
TSH SERPL-ACNC: 2.01 UIU/ML (ref 0.35–4.94)
VIT B12 SERPL-MCNC: 734 PG/ML (ref 213–816)
VLDLC SERPL CALC-MCNC: 12 MG/DL
WBC # SPEC AUTO: 6 X10(3)/MCL (ref 4.5–11.5)

## 2021-01-06 ENCOUNTER — HISTORICAL (OUTPATIENT)
Dept: ADMINISTRATIVE | Facility: HOSPITAL | Age: 71
End: 2021-01-06

## 2021-01-06 LAB
BUN SERPL-MCNC: 16.6 MG/DL (ref 8.4–25.7)
CALCIUM SERPL-MCNC: 8.7 MG/DL (ref 8.8–10)
CHLORIDE SERPL-SCNC: 102 MMOL/L (ref 98–107)
CO2 SERPL-SCNC: 28 MMOL/L (ref 23–31)
CREAT SERPL-MCNC: 0.66 MG/DL (ref 0.72–1.25)
CREAT/UREA NIT SERPL: 25
FOLATE SERPL-MCNC: 11.7 NG/ML (ref 7–31.4)
GLUCOSE SERPL-MCNC: 78 MG/DL (ref 82–115)
MAGNESIUM SERPL-MCNC: 1.75 MG/DL (ref 1.6–2.6)
POTASSIUM SERPL-SCNC: 4.8 MMOL/L (ref 3.5–5.1)
SODIUM SERPL-SCNC: 139 MMOL/L (ref 136–145)
T PALLIDUM AB SER QL: NONREACTIVE

## 2021-08-24 ENCOUNTER — HISTORICAL (OUTPATIENT)
Dept: ADMINISTRATIVE | Facility: HOSPITAL | Age: 71
End: 2021-08-24

## 2021-08-24 LAB
ABS NEUT (OLG): 3.6 X10(3)/MCL (ref 2.1–9.2)
ALBUMIN SERPL-MCNC: 3.9 GM/DL (ref 3.4–5)
ALBUMIN/GLOB SERPL: 1.77 {RATIO} (ref 1.5–2.5)
ALP SERPL-CCNC: 89 UNIT/L (ref 38–126)
ALT SERPL-CCNC: 17 UNIT/L (ref 7–52)
AST SERPL-CCNC: 23 UNIT/L (ref 15–37)
BILIRUB SERPL-MCNC: 0.4 MG/DL (ref 0.2–1)
BILIRUBIN DIRECT+TOT PNL SERPL-MCNC: 0.1 MG/DL (ref 0–0.5)
BILIRUBIN DIRECT+TOT PNL SERPL-MCNC: 0.3 MG/DL
BUN SERPL-MCNC: 11 MG/DL (ref 7–18)
CALCIUM SERPL-MCNC: 9.1 MG/DL (ref 8.5–10.1)
CHLORIDE SERPL-SCNC: 92 MMOL/L (ref 98–107)
CO2 SERPL-SCNC: 28 MMOL/L (ref 21–32)
CREAT SERPL-MCNC: 0.6 MG/DL (ref 0.6–1.3)
CRP SERPL HS-MCNC: 0.1 MG/DL
ERYTHROCYTE [DISTWIDTH] IN BLOOD BY AUTOMATED COUNT: 14.1 % (ref 11.5–17)
ERYTHROCYTE [SEDIMENTATION RATE] IN BLOOD: 14 MM/HR (ref 0–15)
GLOBULIN SER-MCNC: 2.1 GM/DL (ref 1.2–3)
GLUCOSE SERPL-MCNC: 82 MG/DL (ref 74–106)
HCT VFR BLD AUTO: 35.8 % (ref 42–52)
HGB BLD-MCNC: 11.8 GM/DL (ref 14–18)
LYMPHOCYTES # BLD AUTO: 1 X10(3)/MCL (ref 0.6–3.4)
LYMPHOCYTES NFR BLD AUTO: 18.5 % (ref 13–40)
MCH RBC QN AUTO: 29.4 PG (ref 27–31.2)
MCHC RBC AUTO-ENTMCNC: 33 GM/DL (ref 32–36)
MCV RBC AUTO: 89 FL (ref 80–94)
MONOCYTES # BLD AUTO: 1 X10(3)/MCL (ref 0.1–1.3)
MONOCYTES NFR BLD AUTO: 18.4 % (ref 0.1–24)
NEUTROPHILS NFR BLD AUTO: 63.1 % (ref 47–80)
PLATELET # BLD AUTO: 312 X10(3)/MCL (ref 130–400)
PMV BLD AUTO: 9 FL (ref 9.4–12.4)
POTASSIUM SERPL-SCNC: 4.3 MMOL/L (ref 3.5–5.1)
PROT SERPL-MCNC: 6.1 GM/DL (ref 6.4–8.2)
RBC # BLD AUTO: 4.01 X10(6)/MCL (ref 4.7–6.1)
SODIUM SERPL-SCNC: 129 MMOL/L (ref 136–145)
TSH SERPL-ACNC: 1.17 MIU/ML (ref 0.35–4.94)
VIT B12 SERPL-MCNC: 790 PG/ML (ref 213–816)
WBC # SPEC AUTO: 5.6 X10(3)/MCL (ref 4.5–11.5)

## 2021-09-03 ENCOUNTER — HISTORICAL (OUTPATIENT)
Dept: ADMINISTRATIVE | Facility: HOSPITAL | Age: 71
End: 2021-09-03

## 2021-09-03 LAB — CORTIS SERPL-SCNC: 1.6 UG/DL

## 2021-12-20 ENCOUNTER — HISTORICAL (OUTPATIENT)
Dept: RADIOLOGY | Facility: HOSPITAL | Age: 71
End: 2021-12-20

## 2022-01-01 ENCOUNTER — HOSPITAL ENCOUNTER (INPATIENT)
Facility: HOSPITAL | Age: 72
LOS: 6 days | Discharge: NURSING, REHAB OR LONG-TERM CARE | DRG: 641 | End: 2022-10-04
Attending: EMERGENCY MEDICINE | Admitting: INTERNAL MEDICINE
Payer: MEDICARE

## 2022-01-01 ENCOUNTER — HOSPITAL ENCOUNTER (EMERGENCY)
Facility: HOSPITAL | Age: 72
Discharge: HOME | End: 2022-09-24
Attending: STUDENT IN AN ORGANIZED HEALTH CARE EDUCATION/TRAINING PROGRAM
Payer: MEDICARE

## 2022-01-01 VITALS
TEMPERATURE: 98 F | DIASTOLIC BLOOD PRESSURE: 89 MMHG | HEART RATE: 77 BPM | OXYGEN SATURATION: 100 % | HEIGHT: 64 IN | BODY MASS INDEX: 17.07 KG/M2 | WEIGHT: 100 LBS | SYSTOLIC BLOOD PRESSURE: 135 MMHG | RESPIRATION RATE: 15 BRPM

## 2022-01-01 VITALS
OXYGEN SATURATION: 97 % | WEIGHT: 90 LBS | SYSTOLIC BLOOD PRESSURE: 126 MMHG | HEART RATE: 81 BPM | HEIGHT: 64 IN | TEMPERATURE: 98 F | RESPIRATION RATE: 18 BRPM | BODY MASS INDEX: 15.36 KG/M2 | DIASTOLIC BLOOD PRESSURE: 70 MMHG

## 2022-01-01 DIAGNOSIS — R53.1 WEAKNESS: ICD-10-CM

## 2022-01-01 DIAGNOSIS — R42 DIZZINESS: Primary | ICD-10-CM

## 2022-01-01 DIAGNOSIS — R62.7 FAILURE TO THRIVE IN ADULT: Primary | ICD-10-CM

## 2022-01-01 LAB
ALBUMIN SERPL-MCNC: 2.6 GM/DL (ref 3.4–4.8)
ALBUMIN SERPL-MCNC: 2.7 GM/DL (ref 3.4–4.8)
ALBUMIN SERPL-MCNC: 2.9 GM/DL (ref 3.4–4.8)
ALBUMIN SERPL-MCNC: 3.3 GM/DL (ref 3.4–4.8)
ALBUMIN SERPL-MCNC: 3.5 GM/DL (ref 3.4–4.8)
ALBUMIN/GLOB SERPL: 1.4 RATIO (ref 1.1–2)
ALBUMIN/GLOB SERPL: 1.4 RATIO (ref 1.1–2)
ALBUMIN/GLOB SERPL: 1.5 RATIO (ref 1.1–2)
ALP SERPL-CCNC: 102 UNIT/L (ref 40–150)
ALP SERPL-CCNC: 107 UNIT/L (ref 40–150)
ALP SERPL-CCNC: 111 UNIT/L (ref 40–150)
ALP SERPL-CCNC: 122 UNIT/L (ref 40–150)
ALP SERPL-CCNC: 99 UNIT/L (ref 40–150)
ALT SERPL-CCNC: 21 UNIT/L (ref 0–55)
ALT SERPL-CCNC: 22 UNIT/L (ref 0–55)
ALT SERPL-CCNC: 24 UNIT/L (ref 0–55)
ALT SERPL-CCNC: 24 UNIT/L (ref 0–55)
ALT SERPL-CCNC: 27 UNIT/L (ref 0–55)
ANION GAP SERPL CALC-SCNC: 6 MEQ/L
APPEARANCE UR: ABNORMAL
APPEARANCE UR: ABNORMAL
AST SERPL-CCNC: 25 UNIT/L (ref 5–34)
AST SERPL-CCNC: 26 UNIT/L (ref 5–34)
AST SERPL-CCNC: 27 UNIT/L (ref 5–34)
AST SERPL-CCNC: 30 UNIT/L (ref 5–34)
AST SERPL-CCNC: 31 UNIT/L (ref 5–34)
BACTERIA #/AREA URNS AUTO: NORMAL /HPF
BACTERIA #/AREA URNS AUTO: NORMAL /HPF
BASOPHILS # BLD AUTO: 0 X10(3)/MCL (ref 0–0.2)
BASOPHILS # BLD AUTO: 0.01 X10(3)/MCL (ref 0–0.2)
BASOPHILS NFR BLD AUTO: 0 %
BASOPHILS NFR BLD AUTO: 0.2 %
BASOPHILS NFR BLD AUTO: 0.3 %
BASOPHILS NFR BLD AUTO: 0.3 %
BILIRUB UR QL STRIP.AUTO: NEGATIVE MG/DL
BILIRUB UR QL STRIP.AUTO: NEGATIVE MG/DL
BILIRUBIN DIRECT+TOT PNL SERPL-MCNC: 0.3 MG/DL
BILIRUBIN DIRECT+TOT PNL SERPL-MCNC: 0.4 MG/DL
BILIRUBIN DIRECT+TOT PNL SERPL-MCNC: 0.4 MG/DL
BILIRUBIN DIRECT+TOT PNL SERPL-MCNC: 0.5 MG/DL
BILIRUBIN DIRECT+TOT PNL SERPL-MCNC: 0.7 MG/DL
BUN SERPL-MCNC: 11.4 MG/DL (ref 8.4–25.7)
BUN SERPL-MCNC: 13 MG/DL (ref 8.4–25.7)
BUN SERPL-MCNC: 16.2 MG/DL (ref 8.4–25.7)
BUN SERPL-MCNC: 20.4 MG/DL (ref 8.4–25.7)
BUN SERPL-MCNC: 21.9 MG/DL (ref 8.4–25.7)
BUN SERPL-MCNC: 7 MG/DL (ref 8.4–25.7)
CALCIUM SERPL-MCNC: 7.9 MG/DL (ref 8.8–10)
CALCIUM SERPL-MCNC: 8.3 MG/DL (ref 8.8–10)
CALCIUM SERPL-MCNC: 8.7 MG/DL (ref 8.8–10)
CALCIUM SERPL-MCNC: 9.1 MG/DL (ref 8.8–10)
CHLORIDE SERPL-SCNC: 100 MMOL/L (ref 98–107)
CHLORIDE SERPL-SCNC: 90 MMOL/L (ref 98–107)
CHLORIDE SERPL-SCNC: 91 MMOL/L (ref 98–107)
CHLORIDE SERPL-SCNC: 96 MMOL/L (ref 98–107)
CHLORIDE SERPL-SCNC: 96 MMOL/L (ref 98–107)
CHLORIDE SERPL-SCNC: 97 MMOL/L (ref 98–107)
CO2 SERPL-SCNC: 24 MMOL/L (ref 23–31)
CO2 SERPL-SCNC: 25 MMOL/L (ref 23–31)
CO2 SERPL-SCNC: 28 MMOL/L (ref 23–31)
CO2 SERPL-SCNC: 30 MMOL/L (ref 23–31)
CO2 SERPL-SCNC: 30 MMOL/L (ref 23–31)
CO2 SERPL-SCNC: 32 MMOL/L (ref 23–31)
COLOR UR AUTO: YELLOW
COLOR UR AUTO: YELLOW
CREAT SERPL-MCNC: 0.46 MG/DL (ref 0.73–1.18)
CREAT SERPL-MCNC: 0.52 MG/DL (ref 0.73–1.18)
CREAT SERPL-MCNC: 0.59 MG/DL (ref 0.73–1.18)
CREAT SERPL-MCNC: 0.6 MG/DL (ref 0.73–1.18)
CREAT SERPL-MCNC: 0.63 MG/DL (ref 0.73–1.18)
CREAT SERPL-MCNC: 0.63 MG/DL (ref 0.73–1.18)
CREAT/UREA NIT SERPL: 22
DEPRECATED CALCIDIOL+CALCIFEROL SERPL-MC: 39.7 NG/ML (ref 30–80)
EOSINOPHIL # BLD AUTO: 0 X10(3)/MCL (ref 0–0.9)
EOSINOPHIL # BLD AUTO: 0.01 X10(3)/MCL (ref 0–0.9)
EOSINOPHIL # BLD AUTO: 0.03 X10(3)/MCL (ref 0–0.9)
EOSINOPHIL NFR BLD AUTO: 0 %
EOSINOPHIL NFR BLD AUTO: 0.2 %
EOSINOPHIL NFR BLD AUTO: 0.3 %
EOSINOPHIL NFR BLD AUTO: 0.8 %
ERYTHROCYTE [DISTWIDTH] IN BLOOD BY AUTOMATED COUNT: 12.7 % (ref 11.5–17)
ERYTHROCYTE [DISTWIDTH] IN BLOOD BY AUTOMATED COUNT: 12.9 % (ref 11.5–17)
ERYTHROCYTE [DISTWIDTH] IN BLOOD BY AUTOMATED COUNT: 13 % (ref 11.5–17)
ERYTHROCYTE [DISTWIDTH] IN BLOOD BY AUTOMATED COUNT: 13 % (ref 11.5–17)
ERYTHROCYTE [DISTWIDTH] IN BLOOD BY AUTOMATED COUNT: 13.1 % (ref 11.5–17)
ERYTHROCYTE [DISTWIDTH] IN BLOOD BY AUTOMATED COUNT: 13.1 % (ref 11.5–17)
FERRITIN SERPL-MCNC: 199.76 NG/ML (ref 21.81–274.66)
FOLATE SERPL-MCNC: 13.3 NG/ML (ref 7–31.4)
GFR SERPLBLD CREATININE-BSD FMLA CKD-EPI: >60 MLS/MIN/1.73/M2
GLOBULIN SER-MCNC: 1.8 GM/DL (ref 2.4–3.5)
GLOBULIN SER-MCNC: 2 GM/DL (ref 2.4–3.5)
GLOBULIN SER-MCNC: 2 GM/DL (ref 2.4–3.5)
GLOBULIN SER-MCNC: 2.2 GM/DL (ref 2.4–3.5)
GLOBULIN SER-MCNC: 2.3 GM/DL (ref 2.4–3.5)
GLUCOSE SERPL-MCNC: 62 MG/DL (ref 82–115)
GLUCOSE SERPL-MCNC: 64 MG/DL (ref 82–115)
GLUCOSE SERPL-MCNC: 67 MG/DL (ref 82–115)
GLUCOSE SERPL-MCNC: 68 MG/DL (ref 82–115)
GLUCOSE SERPL-MCNC: 82 MG/DL (ref 82–115)
GLUCOSE SERPL-MCNC: 95 MG/DL (ref 82–115)
GLUCOSE UR QL STRIP.AUTO: NEGATIVE MG/DL
GLUCOSE UR QL STRIP.AUTO: NEGATIVE MG/DL
HCT VFR BLD AUTO: 31 % (ref 42–52)
HCT VFR BLD AUTO: 33.1 % (ref 42–52)
HCT VFR BLD AUTO: 34.7 % (ref 42–52)
HCT VFR BLD AUTO: 35.8 % (ref 42–52)
HCT VFR BLD AUTO: 38.2 % (ref 42–52)
HCT VFR BLD AUTO: 39.1 % (ref 42–52)
HGB BLD-MCNC: 10.6 GM/DL (ref 14–18)
HGB BLD-MCNC: 11.2 GM/DL (ref 14–18)
HGB BLD-MCNC: 12 GM/DL (ref 14–18)
HGB BLD-MCNC: 12.6 GM/DL (ref 14–18)
HGB BLD-MCNC: 13.1 GM/DL (ref 14–18)
HGB BLD-MCNC: 13.5 GM/DL (ref 14–18)
IMM GRANULOCYTES # BLD AUTO: 0.01 X10(3)/MCL (ref 0–0.04)
IMM GRANULOCYTES # BLD AUTO: 0.02 X10(3)/MCL (ref 0–0.04)
IMM GRANULOCYTES # BLD AUTO: 0.04 X10(3)/MCL (ref 0–0.04)
IMM GRANULOCYTES # BLD AUTO: 0.06 X10(3)/MCL (ref 0–0.04)
IMM GRANULOCYTES NFR BLD AUTO: 0.3 %
IMM GRANULOCYTES NFR BLD AUTO: 0.4 %
IMM GRANULOCYTES NFR BLD AUTO: 0.4 %
IMM GRANULOCYTES NFR BLD AUTO: 0.5 %
IMM GRANULOCYTES NFR BLD AUTO: 0.9 %
IMM GRANULOCYTES NFR BLD AUTO: 1.7 %
IRON SATN MFR SERPL: ABNORMAL %
IRON SERPL-MCNC: 150 UG/DL (ref 65–175)
KETONES UR QL STRIP.AUTO: NEGATIVE MG/DL
KETONES UR QL STRIP.AUTO: NEGATIVE MG/DL
LAMOTRIGINE SERPL-MCNC: 7.7 MCG/ML (ref 2.5–15)
LEUKOCYTE ESTERASE UR QL STRIP.AUTO: NEGATIVE UNIT/L
LEUKOCYTE ESTERASE UR QL STRIP.AUTO: NEGATIVE UNIT/L
LYMPHOCYTES # BLD AUTO: 0.74 X10(3)/MCL (ref 0.6–4.6)
LYMPHOCYTES # BLD AUTO: 0.75 X10(3)/MCL (ref 0.6–4.6)
LYMPHOCYTES # BLD AUTO: 0.8 X10(3)/MCL (ref 0.6–4.6)
LYMPHOCYTES # BLD AUTO: 0.9 X10(3)/MCL (ref 0.6–4.6)
LYMPHOCYTES # BLD AUTO: 0.96 X10(3)/MCL (ref 0.6–4.6)
LYMPHOCYTES # BLD AUTO: 1.05 X10(3)/MCL (ref 0.6–4.6)
LYMPHOCYTES NFR BLD AUTO: 14.2 %
LYMPHOCYTES NFR BLD AUTO: 19.4 %
LYMPHOCYTES NFR BLD AUTO: 20.9 %
LYMPHOCYTES NFR BLD AUTO: 21.2 %
LYMPHOCYTES NFR BLD AUTO: 23.5 %
LYMPHOCYTES NFR BLD AUTO: 24.5 %
MAGNESIUM SERPL-MCNC: 1.7 MG/DL (ref 1.6–2.6)
MAGNESIUM SERPL-MCNC: 1.7 MG/DL (ref 1.6–2.6)
MAGNESIUM SERPL-MCNC: 1.9 MG/DL (ref 1.6–2.6)
MCH RBC QN AUTO: 30.9 PG (ref 27–31)
MCH RBC QN AUTO: 31.1 PG (ref 27–31)
MCH RBC QN AUTO: 31.3 PG (ref 27–31)
MCH RBC QN AUTO: 31.4 PG (ref 27–31)
MCH RBC QN AUTO: 31.5 PG (ref 27–31)
MCH RBC QN AUTO: 31.6 PG (ref 27–31)
MCHC RBC AUTO-ENTMCNC: 33.8 MG/DL (ref 33–36)
MCHC RBC AUTO-ENTMCNC: 34.2 MG/DL (ref 33–36)
MCHC RBC AUTO-ENTMCNC: 34.3 MG/DL (ref 33–36)
MCHC RBC AUTO-ENTMCNC: 34.5 MG/DL (ref 33–36)
MCHC RBC AUTO-ENTMCNC: 34.6 MG/DL (ref 33–36)
MCHC RBC AUTO-ENTMCNC: 35.2 MG/DL (ref 33–36)
MCV RBC AUTO: 89.3 FL (ref 80–94)
MCV RBC AUTO: 90.7 FL (ref 80–94)
MCV RBC AUTO: 91.1 FL (ref 80–94)
MCV RBC AUTO: 91.3 FL (ref 80–94)
MCV RBC AUTO: 91.4 FL (ref 80–94)
MCV RBC AUTO: 91.4 FL (ref 80–94)
MONOCYTES # BLD AUTO: 0.31 X10(3)/MCL (ref 0.1–1.3)
MONOCYTES # BLD AUTO: 0.37 X10(3)/MCL (ref 0.1–1.3)
MONOCYTES # BLD AUTO: 0.43 X10(3)/MCL (ref 0.1–1.3)
MONOCYTES # BLD AUTO: 0.45 X10(3)/MCL (ref 0.1–1.3)
MONOCYTES # BLD AUTO: 0.45 X10(3)/MCL (ref 0.1–1.3)
MONOCYTES # BLD AUTO: 0.53 X10(3)/MCL (ref 0.1–1.3)
MONOCYTES NFR BLD AUTO: 10.5 %
MONOCYTES NFR BLD AUTO: 10.5 %
MONOCYTES NFR BLD AUTO: 12.3 %
MONOCYTES NFR BLD AUTO: 7.6 %
MONOCYTES NFR BLD AUTO: 9.1 %
MONOCYTES NFR BLD AUTO: 9.8 %
NEUTROPHILS # BLD AUTO: 2.1 X10(3)/MCL (ref 2.1–9.2)
NEUTROPHILS # BLD AUTO: 2.3 X10(3)/MCL (ref 2.1–9.2)
NEUTROPHILS # BLD AUTO: 2.7 X10(3)/MCL (ref 2.1–9.2)
NEUTROPHILS # BLD AUTO: 2.9 X10(3)/MCL (ref 2.1–9.2)
NEUTROPHILS # BLD AUTO: 3.5 X10(3)/MCL (ref 2.1–9.2)
NEUTROPHILS # BLD AUTO: 4.4 X10(3)/MCL (ref 2.1–9.2)
NEUTROPHILS NFR BLD AUTO: 63.9 %
NEUTROPHILS NFR BLD AUTO: 65.5 %
NEUTROPHILS NFR BLD AUTO: 65.8 %
NEUTROPHILS NFR BLD AUTO: 66.1 %
NEUTROPHILS NFR BLD AUTO: 70.7 %
NEUTROPHILS NFR BLD AUTO: 77.4 %
NITRITE UR QL STRIP.AUTO: NEGATIVE
NITRITE UR QL STRIP.AUTO: NEGATIVE
NRBC BLD AUTO-RTO: 0 %
OSMOLALITY SERPL: 272 MOSM/KG (ref 280–300)
PH UR STRIP.AUTO: 7 [PH]
PH UR STRIP.AUTO: 8 [PH]
PHOSPHATE SERPL-MCNC: 1.8 MG/DL (ref 2.3–4.7)
PHOSPHATE SERPL-MCNC: 2.5 MG/DL (ref 2.3–4.7)
PLATELET # BLD AUTO: 200 X10(3)/MCL (ref 130–400)
PLATELET # BLD AUTO: 232 X10(3)/MCL (ref 130–400)
PLATELET # BLD AUTO: 259 X10(3)/MCL (ref 130–400)
PLATELET # BLD AUTO: 259 X10(3)/MCL (ref 130–400)
PLATELET # BLD AUTO: 267 X10(3)/MCL (ref 130–400)
PLATELET # BLD AUTO: 280 X10(3)/MCL (ref 130–400)
PMV BLD AUTO: 8.9 FL (ref 7.4–10.4)
PMV BLD AUTO: 9 FL (ref 7.4–10.4)
PMV BLD AUTO: 9.1 FL (ref 7.4–10.4)
PMV BLD AUTO: 9.3 FL (ref 7.4–10.4)
PMV BLD AUTO: 9.4 FL (ref 7.4–10.4)
PMV BLD AUTO: 9.8 FL (ref 7.4–10.4)
POCT GLUCOSE: 105 MG/DL (ref 70–110)
POCT GLUCOSE: 141 MG/DL (ref 70–110)
POCT GLUCOSE: 180 MG/DL (ref 70–110)
POCT GLUCOSE: 64 MG/DL (ref 70–110)
POCT GLUCOSE: 79 MG/DL (ref 70–110)
POTASSIUM SERPL-SCNC: 3.6 MMOL/L (ref 3.5–5.1)
POTASSIUM SERPL-SCNC: 3.6 MMOL/L (ref 3.5–5.1)
POTASSIUM SERPL-SCNC: 3.8 MMOL/L (ref 3.5–5.1)
POTASSIUM SERPL-SCNC: 4 MMOL/L (ref 3.5–5.1)
POTASSIUM SERPL-SCNC: 4.4 MMOL/L (ref 3.5–5.1)
POTASSIUM SERPL-SCNC: 4.5 MMOL/L (ref 3.5–5.1)
PROT SERPL-MCNC: 4.4 GM/DL (ref 5.8–7.6)
PROT SERPL-MCNC: 4.7 GM/DL (ref 5.8–7.6)
PROT SERPL-MCNC: 4.9 GM/DL (ref 5.8–7.6)
PROT SERPL-MCNC: 5.5 GM/DL (ref 5.8–7.6)
PROT SERPL-MCNC: 5.8 GM/DL (ref 5.8–7.6)
PROT UR QL STRIP.AUTO: ABNORMAL MG/DL
PROT UR QL STRIP.AUTO: NEGATIVE MG/DL
RBC # BLD AUTO: 3.39 X10(6)/MCL (ref 4.7–6.1)
RBC # BLD AUTO: 3.62 X10(6)/MCL (ref 4.7–6.1)
RBC # BLD AUTO: 3.8 X10(6)/MCL (ref 4.7–6.1)
RBC # BLD AUTO: 4.01 X10(6)/MCL (ref 4.7–6.1)
RBC # BLD AUTO: 4.21 X10(6)/MCL (ref 4.7–6.1)
RBC # BLD AUTO: 4.29 X10(6)/MCL (ref 4.7–6.1)
RBC #/AREA URNS AUTO: <5 /HPF
RBC #/AREA URNS AUTO: <5 /HPF
RBC UR QL AUTO: NEGATIVE UNIT/L
RBC UR QL AUTO: NEGATIVE UNIT/L
SARS-COV-2 RDRP RESP QL NAA+PROBE: NEGATIVE
SODIUM SERPL-SCNC: 128 MMOL/L (ref 136–145)
SODIUM SERPL-SCNC: 128 MMOL/L (ref 136–145)
SODIUM SERPL-SCNC: 129 MMOL/L (ref 136–145)
SODIUM SERPL-SCNC: 129 MMOL/L (ref 136–145)
SODIUM SERPL-SCNC: 131 MMOL/L (ref 136–145)
SODIUM SERPL-SCNC: 132 MMOL/L (ref 136–145)
SP GR UR STRIP.AUTO: 1.01 (ref 1–1.03)
SP GR UR STRIP.AUTO: 1.02 (ref 1–1.03)
SQUAMOUS #/AREA URNS AUTO: <5 /HPF
SQUAMOUS #/AREA URNS AUTO: <5 /HPF
T3FREE SERPL-MCNC: 1.53 PG/ML (ref 1.57–3.91)
T4 FREE SERPL-MCNC: 0.75 NG/DL (ref 0.7–1.48)
TIBC SERPL-MCNC: <175 UG/DL (ref 250–450)
TIBC SERPL-MCNC: <25 UG/DL (ref 69–240)
TRANSFERRIN SERPL-MCNC: 130 MG/DL (ref 163–344)
TROPONIN I SERPL-MCNC: 0.01 NG/ML (ref 0–0.04)
TROPONIN I SERPL-MCNC: <0.01 NG/ML (ref 0–0.04)
TSH RECEP AB SER-ACNC: <1.1 IU/L (ref 0–1.75)
TSH SERPL-ACNC: 3.1 UIU/ML (ref 0.35–4.94)
UROBILINOGEN UR STRIP-ACNC: 0.2 MG/DL
UROBILINOGEN UR STRIP-ACNC: 1 MG/DL
VIT B12 SERPL-MCNC: 934 PG/ML (ref 213–816)
WBC # SPEC AUTO: 3.2 X10(3)/MCL (ref 4.5–11.5)
WBC # SPEC AUTO: 3.5 X10(3)/MCL (ref 4.5–11.5)
WBC # SPEC AUTO: 4.3 X10(3)/MCL (ref 4.5–11.5)
WBC # SPEC AUTO: 4.3 X10(3)/MCL (ref 4.5–11.5)
WBC # SPEC AUTO: 4.9 X10(3)/MCL (ref 4.5–11.5)
WBC # SPEC AUTO: 5.7 X10(3)/MCL (ref 4.5–11.5)
WBC #/AREA URNS AUTO: <5 /HPF
WBC #/AREA URNS AUTO: <5 /HPF

## 2022-01-01 PROCEDURE — 80053 COMPREHEN METABOLIC PANEL: CPT | Performed by: PHYSICIAN ASSISTANT

## 2022-01-01 PROCEDURE — 36415 COLL VENOUS BLD VENIPUNCTURE: CPT | Performed by: PHYSICIAN ASSISTANT

## 2022-01-01 PROCEDURE — 84443 ASSAY THYROID STIM HORMONE: CPT | Performed by: NURSE PRACTITIONER

## 2022-01-01 PROCEDURE — 25000003 PHARM REV CODE 250: Performed by: PHYSICIAN ASSISTANT

## 2022-01-01 PROCEDURE — 36415 COLL VENOUS BLD VENIPUNCTURE: CPT | Performed by: INTERNAL MEDICINE

## 2022-01-01 PROCEDURE — 11000001 HC ACUTE MED/SURG PRIVATE ROOM

## 2022-01-01 PROCEDURE — 93010 EKG 12-LEAD: ICD-10-PCS | Mod: ,,, | Performed by: INTERNAL MEDICINE

## 2022-01-01 PROCEDURE — 80053 COMPREHEN METABOLIC PANEL: CPT | Performed by: INTERNAL MEDICINE

## 2022-01-01 PROCEDURE — 97166 OT EVAL MOD COMPLEX 45 MIN: CPT

## 2022-01-01 PROCEDURE — S0179 MEGESTROL 20 MG: HCPCS | Performed by: INTERNAL MEDICINE

## 2022-01-01 PROCEDURE — 25000003 PHARM REV CODE 250: Performed by: INTERNAL MEDICINE

## 2022-01-01 PROCEDURE — 93010 ELECTROCARDIOGRAM REPORT: CPT | Mod: ,,, | Performed by: INTERNAL MEDICINE

## 2022-01-01 PROCEDURE — 97530 THERAPEUTIC ACTIVITIES: CPT | Mod: CQ

## 2022-01-01 PROCEDURE — 80175 DRUG SCREEN QUAN LAMOTRIGINE: CPT | Performed by: NURSE PRACTITIONER

## 2022-01-01 PROCEDURE — 63600175 PHARM REV CODE 636 W HCPCS: Performed by: INTERNAL MEDICINE

## 2022-01-01 PROCEDURE — 83735 ASSAY OF MAGNESIUM: CPT | Performed by: INTERNAL MEDICINE

## 2022-01-01 PROCEDURE — 63600175 PHARM REV CODE 636 W HCPCS: Performed by: PHYSICIAN ASSISTANT

## 2022-01-01 PROCEDURE — 85025 COMPLETE CBC W/AUTO DIFF WBC: CPT | Performed by: INTERNAL MEDICINE

## 2022-01-01 PROCEDURE — 82746 ASSAY OF FOLIC ACID SERUM: CPT | Performed by: INTERNAL MEDICINE

## 2022-01-01 PROCEDURE — 93005 ELECTROCARDIOGRAM TRACING: CPT

## 2022-01-01 PROCEDURE — 85025 COMPLETE CBC W/AUTO DIFF WBC: CPT | Performed by: PHYSICIAN ASSISTANT

## 2022-01-01 PROCEDURE — 84100 ASSAY OF PHOSPHORUS: CPT | Performed by: INTERNAL MEDICINE

## 2022-01-01 PROCEDURE — 36415 COLL VENOUS BLD VENIPUNCTURE: CPT | Performed by: NURSE PRACTITIONER

## 2022-01-01 PROCEDURE — 84481 FREE ASSAY (FT-3): CPT | Performed by: NURSE PRACTITIONER

## 2022-01-01 PROCEDURE — 99285 EMERGENCY DEPT VISIT HI MDM: CPT | Mod: 25

## 2022-01-01 PROCEDURE — 81001 URINALYSIS AUTO W/SCOPE: CPT | Performed by: PHYSICIAN ASSISTANT

## 2022-01-01 PROCEDURE — 25000003 PHARM REV CODE 250: Performed by: NURSE PRACTITIONER

## 2022-01-01 PROCEDURE — 97542 WHEELCHAIR MNGMENT TRAINING: CPT | Mod: CO

## 2022-01-01 PROCEDURE — 82306 VITAMIN D 25 HYDROXY: CPT | Performed by: INTERNAL MEDICINE

## 2022-01-01 PROCEDURE — 84439 ASSAY OF FREE THYROXINE: CPT | Performed by: NURSE PRACTITIONER

## 2022-01-01 PROCEDURE — 83520 IMMUNOASSAY QUANT NOS NONAB: CPT | Performed by: NURSE PRACTITIONER

## 2022-01-01 PROCEDURE — 82607 VITAMIN B-12: CPT | Performed by: INTERNAL MEDICINE

## 2022-01-01 PROCEDURE — 83930 ASSAY OF BLOOD OSMOLALITY: CPT | Performed by: PHYSICIAN ASSISTANT

## 2022-01-01 PROCEDURE — 84484 ASSAY OF TROPONIN QUANT: CPT | Performed by: PHYSICIAN ASSISTANT

## 2022-01-01 PROCEDURE — 83540 ASSAY OF IRON: CPT | Performed by: INTERNAL MEDICINE

## 2022-01-01 PROCEDURE — 93010 ELECTROCARDIOGRAM REPORT: CPT | Mod: 76,,, | Performed by: INTERNAL MEDICINE

## 2022-01-01 PROCEDURE — 82728 ASSAY OF FERRITIN: CPT | Performed by: INTERNAL MEDICINE

## 2022-01-01 PROCEDURE — 97162 PT EVAL MOD COMPLEX 30 MIN: CPT

## 2022-01-01 PROCEDURE — 97535 SELF CARE MNGMENT TRAINING: CPT | Mod: CO

## 2022-01-01 PROCEDURE — 87635 SARS-COV-2 COVID-19 AMP PRB: CPT | Performed by: INTERNAL MEDICINE

## 2022-01-01 PROCEDURE — 97530 THERAPEUTIC ACTIVITIES: CPT | Mod: CO

## 2022-01-01 PROCEDURE — 80048 BASIC METABOLIC PNL TOTAL CA: CPT | Performed by: INTERNAL MEDICINE

## 2022-01-01 RX ORDER — IBUPROFEN 200 MG
24 TABLET ORAL
Status: DISCONTINUED | OUTPATIENT
Start: 2022-01-01 | End: 2022-01-01 | Stop reason: HOSPADM

## 2022-01-01 RX ORDER — SODIUM CHLORIDE 9 MG/ML
1000 INJECTION, SOLUTION INTRAVENOUS
Status: DISCONTINUED | OUTPATIENT
Start: 2022-01-01 | End: 2022-01-01

## 2022-01-01 RX ORDER — ONDANSETRON 2 MG/ML
4 INJECTION INTRAMUSCULAR; INTRAVENOUS EVERY 8 HOURS PRN
Status: DISCONTINUED | OUTPATIENT
Start: 2022-01-01 | End: 2022-01-01 | Stop reason: HOSPADM

## 2022-01-01 RX ORDER — ERGOCALCIFEROL 1.25 MG/1
50000 CAPSULE ORAL
Status: DISCONTINUED | OUTPATIENT
Start: 2022-01-01 | End: 2022-01-01 | Stop reason: HOSPADM

## 2022-01-01 RX ORDER — MEGESTROL ACETATE 40 MG/ML
400 SUSPENSION ORAL 2 TIMES DAILY
Status: DISCONTINUED | OUTPATIENT
Start: 2022-01-01 | End: 2022-01-01 | Stop reason: HOSPADM

## 2022-01-01 RX ORDER — MEGESTROL ACETATE 40 MG/ML
200 SUSPENSION ORAL 2 TIMES DAILY
Status: DISCONTINUED | OUTPATIENT
Start: 2022-01-01 | End: 2022-01-01

## 2022-01-01 RX ORDER — ERGOCALCIFEROL 1.25 MG/1
50000 CAPSULE ORAL
Qty: 4 CAPSULE | Refills: 2
Start: 2022-01-01 | End: 2022-01-01

## 2022-01-01 RX ORDER — MIRTAZAPINE 15 MG/1
15 TABLET, FILM COATED ORAL NIGHTLY
Qty: 30 TABLET | Refills: 11
Start: 2022-01-01 | End: 2022-01-01

## 2022-01-01 RX ORDER — SODIUM CHLORIDE 9 MG/ML
INJECTION, SOLUTION INTRAVENOUS CONTINUOUS
Status: DISCONTINUED | OUTPATIENT
Start: 2022-01-01 | End: 2022-01-01

## 2022-01-01 RX ORDER — TAMSULOSIN HYDROCHLORIDE 0.4 MG/1
0.4 CAPSULE ORAL DAILY
Status: DISCONTINUED | OUTPATIENT
Start: 2022-01-01 | End: 2022-01-01 | Stop reason: HOSPADM

## 2022-01-01 RX ORDER — TALC
6 POWDER (GRAM) TOPICAL NIGHTLY PRN
Refills: 0
Start: 2022-01-01

## 2022-01-01 RX ORDER — TAMSULOSIN HYDROCHLORIDE 0.4 MG/1
0.4 CAPSULE ORAL DAILY
Qty: 30 CAPSULE | Refills: 11
Start: 2022-01-01 | End: 2023-10-04

## 2022-01-01 RX ORDER — HYDRALAZINE HYDROCHLORIDE 20 MG/ML
20 INJECTION INTRAMUSCULAR; INTRAVENOUS EVERY 4 HOURS PRN
Status: DISCONTINUED | OUTPATIENT
Start: 2022-01-01 | End: 2022-01-01 | Stop reason: HOSPADM

## 2022-01-01 RX ORDER — GABAPENTIN 100 MG/1
200 CAPSULE ORAL NIGHTLY
Qty: 60 CAPSULE | Refills: 2 | Status: SHIPPED | OUTPATIENT
Start: 2022-01-01 | End: 2023-01-01

## 2022-01-01 RX ORDER — FAMOTIDINE 20 MG/1
20 TABLET, FILM COATED ORAL 2 TIMES DAILY
Status: DISCONTINUED | OUTPATIENT
Start: 2022-01-01 | End: 2022-01-01 | Stop reason: HOSPADM

## 2022-01-01 RX ORDER — ENOXAPARIN SODIUM 100 MG/ML
40 INJECTION SUBCUTANEOUS EVERY 24 HOURS
Status: DISCONTINUED | OUTPATIENT
Start: 2022-01-01 | End: 2022-01-01 | Stop reason: HOSPADM

## 2022-01-01 RX ORDER — CALCIUM CARBONATE 200(500)MG
500 TABLET,CHEWABLE ORAL ONCE
Status: COMPLETED | OUTPATIENT
Start: 2022-01-01 | End: 2022-01-01

## 2022-01-01 RX ORDER — ACETAMINOPHEN 325 MG/1
650 TABLET ORAL EVERY 4 HOURS PRN
Status: DISCONTINUED | OUTPATIENT
Start: 2022-01-01 | End: 2022-01-01 | Stop reason: HOSPADM

## 2022-01-01 RX ORDER — MEGESTROL ACETATE 40 MG/ML
400 SUSPENSION ORAL 2 TIMES DAILY
Start: 2022-01-01 | End: 2022-01-01

## 2022-01-01 RX ORDER — FAMOTIDINE 20 MG/1
20 TABLET, FILM COATED ORAL DAILY
Qty: 30 TABLET | Refills: 11
Start: 2022-01-01 | End: 2023-01-01 | Stop reason: SDUPTHER

## 2022-01-01 RX ORDER — FERROUS SULFATE 325(65) MG
325 TABLET, DELAYED RELEASE (ENTERIC COATED) ORAL DAILY
Refills: 0
Start: 2022-01-01

## 2022-01-01 RX ORDER — ACETAMINOPHEN 325 MG/1
650 TABLET ORAL EVERY 8 HOURS PRN
Status: DISCONTINUED | OUTPATIENT
Start: 2022-01-01 | End: 2022-01-01 | Stop reason: HOSPADM

## 2022-01-01 RX ORDER — IBUPROFEN 200 MG
16 TABLET ORAL
Status: DISCONTINUED | OUTPATIENT
Start: 2022-01-01 | End: 2022-01-01 | Stop reason: HOSPADM

## 2022-01-01 RX ORDER — TALC
6 POWDER (GRAM) TOPICAL NIGHTLY PRN
Status: DISCONTINUED | OUTPATIENT
Start: 2022-01-01 | End: 2022-01-01 | Stop reason: HOSPADM

## 2022-01-01 RX ORDER — FAMOTIDINE 20 MG/1
20 TABLET, FILM COATED ORAL DAILY
Status: DISCONTINUED | OUTPATIENT
Start: 2022-01-01 | End: 2022-01-01

## 2022-01-01 RX ORDER — MIRTAZAPINE 15 MG/1
15 TABLET, FILM COATED ORAL NIGHTLY
Status: DISCONTINUED | OUTPATIENT
Start: 2022-01-01 | End: 2022-01-01 | Stop reason: HOSPADM

## 2022-01-01 RX ORDER — GLUCAGON 1 MG
1 KIT INJECTION
Status: DISCONTINUED | OUTPATIENT
Start: 2022-01-01 | End: 2022-01-01 | Stop reason: HOSPADM

## 2022-01-01 RX ADMIN — MEGESTROL ACETATE 400 MG: 400 SUSPENSION ORAL at 09:10

## 2022-01-01 RX ADMIN — MEGESTROL ACETATE 400 MG: 400 SUSPENSION ORAL at 08:10

## 2022-01-01 RX ADMIN — CALCIUM CARBONATE (ANTACID) CHEW TAB 500 MG 500 MG: 500 CHEW TAB at 08:10

## 2022-01-01 RX ADMIN — SODIUM CHLORIDE 125 MG: 9 INJECTION, SOLUTION INTRAVENOUS at 09:09

## 2022-01-01 RX ADMIN — LAMOTRIGINE 150 MG: 100 TABLET ORAL at 08:10

## 2022-01-01 RX ADMIN — FAMOTIDINE 20 MG: 20 TABLET, FILM COATED ORAL at 08:10

## 2022-01-01 RX ADMIN — FAMOTIDINE 20 MG: 20 TABLET, FILM COATED ORAL at 09:10

## 2022-01-01 RX ADMIN — ENOXAPARIN SODIUM 40 MG: 40 INJECTION SUBCUTANEOUS at 04:10

## 2022-01-01 RX ADMIN — TAMSULOSIN HYDROCHLORIDE 0.4 MG: 0.4 CAPSULE ORAL at 08:10

## 2022-01-01 RX ADMIN — ENOXAPARIN SODIUM 40 MG: 40 INJECTION SUBCUTANEOUS at 05:09

## 2022-01-01 RX ADMIN — LAMOTRIGINE 150 MG: 100 TABLET ORAL at 09:10

## 2022-01-01 RX ADMIN — ONDANSETRON 4 MG: 2 INJECTION INTRAMUSCULAR; INTRAVENOUS at 05:10

## 2022-01-01 RX ADMIN — SODIUM CHLORIDE: 9 INJECTION, SOLUTION INTRAVENOUS at 12:10

## 2022-01-01 RX ADMIN — MEGESTROL ACETATE 200 MG: 400 SUSPENSION ORAL at 08:10

## 2022-01-01 RX ADMIN — FAMOTIDINE 20 MG: 20 TABLET, FILM COATED ORAL at 12:09

## 2022-01-01 RX ADMIN — SODIUM CHLORIDE 125 MG: 9 INJECTION, SOLUTION INTRAVENOUS at 08:10

## 2022-01-01 RX ADMIN — TAMSULOSIN HYDROCHLORIDE 0.4 MG: 0.4 CAPSULE ORAL at 09:10

## 2022-01-01 RX ADMIN — MELATONIN TAB 3 MG 6 MG: 3 TAB at 08:10

## 2022-01-01 RX ADMIN — LAMOTRIGINE 150 MG: 100 TABLET ORAL at 10:09

## 2022-01-01 RX ADMIN — ACETAMINOPHEN 650 MG: 325 TABLET ORAL at 06:10

## 2022-01-01 RX ADMIN — MELATONIN TAB 3 MG 6 MG: 3 TAB at 08:09

## 2022-01-01 RX ADMIN — ERGOCALCIFEROL 50000 UNITS: 1.25 CAPSULE ORAL at 12:09

## 2022-01-01 RX ADMIN — TAMSULOSIN HYDROCHLORIDE 0.4 MG: 0.4 CAPSULE ORAL at 09:09

## 2022-01-01 RX ADMIN — LAMOTRIGINE 150 MG: 100 TABLET ORAL at 09:09

## 2022-01-01 RX ADMIN — MIRTAZAPINE 15 MG: 15 TABLET, FILM COATED ORAL at 08:10

## 2022-01-01 RX ADMIN — SODIUM CHLORIDE: 9 INJECTION, SOLUTION INTRAVENOUS at 03:09

## 2022-01-01 RX ADMIN — LAMOTRIGINE 150 MG: 100 TABLET ORAL at 08:09

## 2022-01-01 RX ADMIN — FAMOTIDINE 20 MG: 20 TABLET, FILM COATED ORAL at 09:09

## 2022-01-01 RX ADMIN — SODIUM CHLORIDE: 9 INJECTION, SOLUTION INTRAVENOUS at 04:09

## 2022-01-01 RX ADMIN — MEGESTROL ACETATE 200 MG: 400 SUSPENSION ORAL at 08:09

## 2022-01-01 RX ADMIN — SODIUM CHLORIDE: 9 INJECTION, SOLUTION INTRAVENOUS at 11:10

## 2022-01-01 RX ADMIN — SODIUM CHLORIDE 125 MG: 9 INJECTION, SOLUTION INTRAVENOUS at 09:10

## 2022-01-01 RX ADMIN — SODIUM CHLORIDE: 9 INJECTION, SOLUTION INTRAVENOUS at 09:09

## 2022-01-01 RX ADMIN — MELATONIN TAB 3 MG 6 MG: 3 TAB at 02:09

## 2022-04-10 ENCOUNTER — HISTORICAL (OUTPATIENT)
Dept: ADMINISTRATIVE | Facility: HOSPITAL | Age: 72
End: 2022-04-10
Payer: COMMERCIAL

## 2022-04-11 ENCOUNTER — HISTORICAL (OUTPATIENT)
Dept: ADMINISTRATIVE | Facility: HOSPITAL | Age: 72
End: 2022-04-11
Payer: COMMERCIAL

## 2022-04-28 VITALS
SYSTOLIC BLOOD PRESSURE: 146 MMHG | DIASTOLIC BLOOD PRESSURE: 78 MMHG | BODY MASS INDEX: 18.4 KG/M2 | WEIGHT: 107.81 LBS | OXYGEN SATURATION: 97 % | HEIGHT: 64 IN

## 2022-04-28 VITALS
BODY MASS INDEX: 18.4 KG/M2 | SYSTOLIC BLOOD PRESSURE: 146 MMHG | OXYGEN SATURATION: 97 % | HEIGHT: 64 IN | WEIGHT: 107.81 LBS | DIASTOLIC BLOOD PRESSURE: 78 MMHG

## 2022-05-03 NOTE — HISTORICAL [facility] CERNER
This is a historical note converted from Sonya. Formatting and pictures may have been removed.  Please reference Sonya for original formatting and attached multimedia. Chief Complaint  Weakness  History of Present Illness  70-year-old male at [facility] for?compression fractures of vertebral column including pretty much all lumbar vertebrae and inferior thoracic vertebrae with T10 and T12 being the most significant acute fractures. ?He was transferred here for?PT/OT. ?Recommendation is a TLSO brace when patient is going to be up. ?According to neurosurgery there is no indication for kyphoplasty. ?Patient is extremely anorexic with a weight of about 90 pounds and ongoing weight loss due to postprandial pain and diarrhea after eating that makes him food adverse.???  ?  He is eating lunch today and has no acute complaints. ?Working?with PT/OT.  Review of Systems  All other systems reviewed and negative otherwise.  Physical Exam  Vitals & Measurements  T:?36.6? ?C (Oral)? TMIN:?36.3? ?C (Oral)? TMAX:?36.6? ?C (Oral)? HR:?74(Peripheral)? BP:?111/65? SpO2:?100%? WT:?41.2?kg?  General:? Thin and frail.?No acute distress  Eye:? EOMI, clear conjunctiva  Respiratory:? clear to auscultation bilaterally. No rhonchi, wheezes or rales.?  Cardiovascular:? regular rate and rhythm without murmurs, gallops or rubs  Gastrointestinal:?soft, non-tender, non-distended. Normoactive normal bowel sounds.  Musculoskeletal:?Normal tone. No edema.?  Integumentary: warm, dry  Neurologic: AAO x3. Symmetric and spontaneous movement of all extremities.?No facial assymetry  Psychiatry: Appropriate affect  Assessment/Plan  1.?Lumbar vertebral fracture?S32.009A  ?Continue PT/OT.  2.?Depression?F32.9  ?Appears reasonably controlled on his current regimen.? May want to consider?changing to mirtazapine which would help with his appetite as well.  3.?Malnourished?E46  ?Continue to supplement and encourage intake.  4.?Epilepsy?G40.909  ?Continue  home meds.  Orders:  melatonin, 3 mg, form: Tab, Oral, At Bedtime, first dose 10/02/20 21:00:00 CDT  CBC w/ Auto Diff, Routine collect, 10/03/20 5:00:00 CDT, Blood, Stop date 10/03/20 5:00:00 CDT, Lab Collect, 10/03/20 5:00:00 CDT  Comprehensive Metabolic Panel, Routine collect, 10/03/20 5:00:00 CDT, Blood, Stop date 10/03/20 5:00:00 CDT, Lab Collect, 10/03/20 5:00:00 CDT   Problem List/Past Medical History  Ongoing  Bilateral cataracts  Depression  Epilepsy  Lumbar vertebral fracture  Osteoporosis  Historical  Back pain  Colitis  Minimally displaced Zone I fracture of sacrum, initial encounter for closed fracture  Seizure disorder  Procedure/Surgical History  Electroencephalogram (EEG); including recording awake and drowsy (08/10/2017)  Monitoring of Central Nervous Electrical Activity, External Approach (08/10/2017)  Insertion of Internal Fixation Device into Sacrum, Percutaneous Approach (08/05/2016)  ORIF Sacroiliac Joint (Left) (08/05/2016)  Extracapsular cataract removal with insertion of intraocular lens prosthesis (1 stage procedure), manual or mechanical technique (eg, irrigation and aspiration or phacoemulsification). (04/24/2013)  Insertion of intraocular lens prosthesis at time of cataract extraction, one-stage (04/24/2013)  Phacoemulsification and aspiration of cataract (04/24/2013)  Reduction Nasal Fracture  Tonsillectomy   Medications  Inpatient  alendronate 70 mg oral tablet, 70 mg= 1 tab(s), Oral, Daily  Allegra, 180 mg= 1 tab(s), Oral, Daily, PRN  Ambien 5 mg oral tablet, 5 mg= 1 tab(s), Oral, At Bedtime, PRN  Amino Acids 4.25% with 5% Dextrose (Clinimix Sulfite-Free) intravenous solution 2,000 mL, 2000 mL, IV  calcium carbonate, 600 mg= 1 tab(s), Oral, BID  diphenhydrAMINE ORAL, 25 mg= 1 cap(s), Oral, q4hr, PRN  Dulcolax Laxative 5 mg ORAL enteric coated tablet, 10 mg= 2 tab(s), Oral, Daily, PRN  ergocalciferol 50,000 intUnit oral capsule, 42525 units= 1 cap(s), Oral, q7day  hydrALAZINE 20 mg/mL  injectable solution, 10 mg= 0.5 mL, IV, q3hr, PRN  lamoTRIgine 100 mg oral tablet, 100 mg= 1 tab(s), Oral, BID  latanoprost 0.005% ophthalmic solution, 2 drop(s), Eye-Both, Daily  [facility] melatonin 3 mg oral tablet, 3 mg= 1 tab(s), Oral, At Bedtime  megestrol 40 mg/mL oral suspension, 400 mg= 10 mL, Oral, BID  multivitamin with minerals (Adult Tab), 1 tab(s), Oral, Daily  Norco 10 mg-325 mg oral tablet, 1 tab(s), Oral, q4hr, PRN  oxcarbazepine 300 mg oral tablet, 300 mg= 1 tab(s), Oral, BID  Pantoprazole 40 mg ORAL EC-Tablet, 40 mg= 1 tab(s), Oral, Daily  tamsulosin 0.4 mg oral capsule, 0.4 mg= 1 cap(s), Oral, Daily  Tessalon Perles, 200 mg= 2 cap(s), Oral, q8hr, PRN  Tylenol 325 mg oral tablet, 650 mg= 2 tab(s), Oral, q4hr, PRN  Tylenol 325 mg oral tablet, 650 mg= 2 tab(s), Oral, q4hr, PRN  venlafaxine 150 mg oral capsule, extended release, 150 mg= 1 cap(s), Oral, Daily  Zofran 2 mg/mL injectable solution, 4 mg= 2 mL, IV, q4hr, PRN  Zofran ODT 4 mg oral tablet, disintegrating, 8 mg= 2 tab(s), Oral, QID, PRN  Home  acetaminophen-HYDROcodone, Oral, q6hr, PRN,? ?Not taking  acetaminophen-hydrocodone 325 mg-5 mg oral tablet, 1 tab(s), Oral, q6hr, PRN  Colace 100 mg oral capsule, 100 mg= 1 cap(s), Oral, BID  lamoTRIgine 150 mg oral tab, Oral, BID  latanoprost 0.005% ophthalmic solution, 2 drop(s), Eye-Both, Daily  Linzess 145 mcg oral capsule, 145 mcg= 1 cap(s), Oral, Daily  Lovenox, 30 mg= 0.3 mL, Subcutaneous, Daily  megestrol 40 mg/mL oral suspension, 400 mg= 10 mL, Oral, BID  multivitamin with minerals (Senior Tab), 1 tab(s), Oral, Daily  oxcarbazepine 300 mg oral tablet, 300 mg= 1 tab(s), Oral, BID  Pantoprazole 40 mg ORAL EC-Tablet, 40 mg= 1 tab(s), Oral, Daily  polyethylene glycol 3350 ( MiraLax ), 17 gm, Oral, Daily  tamsulosin 0.4 mg oral capsule, 0.4 mg= 1 cap(s), Oral, Daily  venlafaxine 150 mg oral capsule, extended release, 150 mg= 1 cap(s), Oral, Daily  Allergies  No Known Allergies  Social  History  Abuse/Neglect  No, Yes, Yes, 09/30/2020  No, 09/24/2020  No, 09/21/2020  No, No, Yes, 02/17/2020  Alcohol - Denies Alcohol Use, 04/21/2013  Never, 09/30/2020  Never, 02/17/2020  Employment/School  Unemployed, Highest education level: High school., 04/21/2013  Home/Environment  Lives with Alone., 04/21/2013  Nutrition/Health  Regular, 04/21/2013  Substance Use - Denies Substance Abuse, 04/21/2013  Never, 09/30/2020  Never, 02/17/2020  Tobacco - Denies Tobacco Use, 04/21/2013  Never (less than 100 in lifetime), N/A, 09/30/2020  Never (less than 100 in lifetime), No, 09/24/2020  Never (less than 100 in lifetime), No, 02/17/2020  Family History  Acute myocardial infarction.: Father and Brother.  Hypertension.: Brother.  Malignant lymphoma: Father.  Pacemaker......: Negative: Father.  Primary malignant neoplasm of colon: Father.  Seizure: Mother.  Immunizations  Vaccine Date Status Comments   influenza virus vaccine, inactivated 09/30/2020 Given    influenza virus vaccine, inactivated 02/05/2020 Given    pneumococcal 13-valent conjugate vaccine 03/22/2019 Given    influenza virus vaccine, inactivated 09/06/2018 Given    zoster vaccine live 07/21/2014 Recorded    influenza virus vaccine, inactivated 11/25/2013 Given Nursing Judgment

## 2022-08-01 ENCOUNTER — OFFICE VISIT (OUTPATIENT)
Dept: NEUROLOGY | Facility: CLINIC | Age: 72
End: 2022-08-01
Payer: MEDICARE

## 2022-08-01 VITALS
BODY MASS INDEX: 18.96 KG/M2 | HEIGHT: 63 IN | WEIGHT: 107 LBS | DIASTOLIC BLOOD PRESSURE: 74 MMHG | SYSTOLIC BLOOD PRESSURE: 124 MMHG

## 2022-08-01 DIAGNOSIS — S32.000A COMPRESSION FRACTURE OF LUMBAR VERTEBRA, UNSPECIFIED LUMBAR VERTEBRAL LEVEL, INITIAL ENCOUNTER: Primary | ICD-10-CM

## 2022-08-01 PROCEDURE — 99204 PR OFFICE/OUTPT VISIT, NEW, LEVL IV, 45-59 MIN: ICD-10-PCS | Mod: S$PBB,,, | Performed by: PSYCHIATRY & NEUROLOGY

## 2022-08-01 PROCEDURE — 99213 OFFICE O/P EST LOW 20 MIN: CPT | Mod: PBBFAC | Performed by: PSYCHIATRY & NEUROLOGY

## 2022-08-01 PROCEDURE — 99999 PR PBB SHADOW E&M-EST. PATIENT-LVL III: ICD-10-PCS | Mod: PBBFAC,,, | Performed by: PSYCHIATRY & NEUROLOGY

## 2022-08-01 PROCEDURE — 99204 OFFICE O/P NEW MOD 45 MIN: CPT | Mod: S$PBB,,, | Performed by: PSYCHIATRY & NEUROLOGY

## 2022-08-01 PROCEDURE — 99999 PR PBB SHADOW E&M-EST. PATIENT-LVL III: CPT | Mod: PBBFAC,,, | Performed by: PSYCHIATRY & NEUROLOGY

## 2022-08-01 RX ORDER — OXCARBAZEPINE 300 MG/1
150 TABLET, FILM COATED ORAL 2 TIMES DAILY
Status: ON HOLD | COMMUNITY
End: 2022-01-01 | Stop reason: HOSPADM

## 2022-08-01 RX ORDER — ACETAMINOPHEN, DIPHENHYDRAMINE HCL, PHENYLEPHRINE HCL 325; 25; 5 MG/1; MG/1; MG/1
TABLET ORAL
Status: ON HOLD | COMMUNITY
End: 2022-01-01 | Stop reason: HOSPADM

## 2022-08-01 NOTE — PROGRESS NOTES
Neurology Office Visit  Neurology    Ricardo Cristobal is a 71 y.o. male for NP betsey.  MRI shows VCF.  Presently, patient denies pain, but he requires bracing.      ROS:  Review of Systems   All other systems reviewed and are negative.       Past Medical History:   Diagnosis Date    BPH with obstruction/lower urinary tract symptoms     Cachexia     Depressive disorder     GERD (gastroesophageal reflux disease)     Osteoporosis     Vitamin D deficiency      Past Surgical History:   Procedure Laterality Date    BACK SURGERY      CATARACT EXTRACTION      Electroencephalogram (EEG); including recording awake and drowsy (08/10/2017)      Extracapsular cataract removal with insertion of intraocular lens prosthesis (1 stage procedure), manual or mechanical technique (eg, irrigation and aspiration or phacoemulsification). (04/24/2013)      NOSE SURGERY      OPEN REDUCTION NASAL FRACTURE      ORIF Sacroiliac Joint (Left) (08/05/2016)      Repair Scalp Subcutaneous Tissue and Fascia, Open Approach (11/11/2020)      TONSILLECTOMY       Family History   Problem Relation Age of Onset    Seizures Mother     Heart disease Father     Cancer Father     Lymphoma Father     Hypertension Sister     Heart disease Brother     Hypertension Brother     Alzheimer's disease Brother     Heart failure Father     Heart failure Brother     Hypertension Brother      Review of patient's allergies indicates:  No Known Allergies    Current Outpatient Medications:     alendronate (FOSAMAX) 70 MG tablet, See Instructions, TAKE 1 TABLET EVERY WEEK, # 12 tab(s), 3 Refill(s), Pharmacy: [facility], 162, cm, Height/Length Dosing, 01/17/22 13:50:00 CST, 48.9, kg, Weight Dosing, 01/17/22 13:50:00 CST, Disp: 12 tablet, Rfl: 3    ergocalciferol (ERGOCALCIFEROL) 50,000 unit Cap, Take 50,000 Int'l Units by mouth., Disp: , Rfl:     gabapentin (NEURONTIN) 300 MG capsule, TAKE 1 CAPSULE BY MOUTH AT BEDTIME, Disp: 30  capsule, Rfl: 5    lamoTRIgine (LAMICTAL) 150 MG Tab,  See Instructions, TAKE 1 TABLET TWICE DAILY, # 60 tab(s), 0 Refill(s), Pharmacy: [facility]/pharmacy #[id number], 162, cm, Height/Length Dosing, 02/24/21 15:20:00 CST, 51.4, kg, Weight Dosing, 02/24/21 15:20:00 CST, Disp: , Rfl:     latanoprost 0.005 % ophthalmic solution, , Disp: , Rfl:     melatonin 10 mg Tab, Take by mouth., Disp: , Rfl:     mirtazapine (REMERON) 15 MG tablet, , Disp: , Rfl:     OXcarbazepine (TRILEPTAL) 300 MG Tab, Take 150 mg by mouth 2 (two) times daily., Disp: , Rfl:     sodium chloride 1 gram tablet, Take by mouth., Disp: , Rfl:     tamsulosin (FLOMAX) 0.4 mg Cap, , Disp: , Rfl:   Outpatient Medications Marked as Taking for the 8/1/22 encounter (Office Visit) with Darren Beverly MD   Medication Sig Dispense Refill    alendronate (FOSAMAX) 70 MG tablet See Instructions, TAKE 1 TABLET EVERY WEEK, # 12 tab(s), 3 Refill(s), Pharmacy: [facility], 162, cm, Height/Length Dosing, 01/17/22 13:50:00 CST, 48.9, kg, Weight Dosing, 01/17/22 13:50:00 CST 12 tablet 3    ergocalciferol (ERGOCALCIFEROL) 50,000 unit Cap Take 50,000 Int'l Units by mouth.      gabapentin (NEURONTIN) 300 MG capsule TAKE 1 CAPSULE BY MOUTH AT BEDTIME 30 capsule 5    lamoTRIgine (LAMICTAL) 150 MG Tab   See Instructions, TAKE 1 TABLET TWICE DAILY, # 60 tab(s), 0 Refill(s), Pharmacy: [facility]/pharmacy #[id number], 162, cm, Height/Length Dosing, 02/24/21 15:20:00 CST, 51.4, kg, Weight Dosing, 02/24/21 15:20:00 CST      latanoprost 0.005 % ophthalmic solution       melatonin 10 mg Tab Take by mouth.      mirtazapine (REMERON) 15 MG tablet       OXcarbazepine (TRILEPTAL) 300 MG Tab Take 150 mg by mouth 2 (two) times daily.      sodium chloride 1 gram tablet Take by mouth.      tamsulosin (FLOMAX) 0.4 mg Cap       [DISCONTINUED] pantoprazole (PROTONIX) 40 MG tablet        Social History     Tobacco Use    Smoking status: Never Smoker    Smokeless tobacco: Never  Used   Substance Use Topics    Alcohol use: Not Currently    Drug use: Never         Vitals:    08/01/22 1315   BP: 124/74     Gen NAD  HEENT NC/AT  CV RRR, no carotid bruits  Resp clear  GI soft  Ext no C/C/E  Neuro  AAOx4  Speech fluent, appropriate  EOMI, PERRLA, VFF, no papilledema  Normal facial strength, sensation  Tongue and palate midline  Motor 5/5  Sensation intact  DTRs symmetric  Coord intact  Gait with walker    Assessment: VCF    Plan: Advised patient to assess his ADLs without bracing to determine need for kypho

## 2022-09-22 PROBLEM — R64 CACHEXIA: Status: ACTIVE | Noted: 2022-01-01

## 2022-09-22 PROBLEM — G40.909 EPILEPSY, UNSPECIFIED, NOT INTRACTABLE, WITHOUT STATUS EPILEPTICUS: Status: ACTIVE | Noted: 2021-07-02

## 2022-09-22 PROBLEM — S32.009A FRACTURE OF LUMBAR VERTEBRA: Status: ACTIVE | Noted: 2022-01-01

## 2022-09-22 PROBLEM — N13.8 BENIGN PROSTATIC HYPERPLASIA WITH URINARY OBSTRUCTION: Status: ACTIVE | Noted: 2022-01-01

## 2022-09-22 PROBLEM — M81.0 OSTEOPOROSIS: Status: ACTIVE | Noted: 2022-01-01

## 2022-09-22 PROBLEM — F32.A DEPRESSIVE DISORDER: Status: ACTIVE | Noted: 2022-01-01

## 2022-09-22 PROBLEM — N40.1 BENIGN PROSTATIC HYPERPLASIA WITH URINARY OBSTRUCTION: Status: ACTIVE | Noted: 2022-01-01

## 2022-09-22 PROBLEM — K21.9 GASTROESOPHAGEAL REFLUX DISEASE: Status: ACTIVE | Noted: 2022-01-01

## 2022-09-22 PROBLEM — H26.9 CATARACT OF BOTH EYES: Status: ACTIVE | Noted: 2022-01-01

## 2022-09-24 NOTE — DISCHARGE INSTRUCTIONS
Thanks for letting us take care of you today!  It is our goal to give you courteous care and to keep you comfortable and informed, if you have any questions before you leave I will be happy to try and answer them.    Here is some advice after your visit:    Your visit in the emergency department is NOT definitive care - please follow-up with your primary care doctor and/or specialist within 1-2 days. Please return to the emergency department if you develop worsening symptoms including: fever, chills, chest pain, shortness of breath, weakness, numbness, tingling, nausea, vomiting, inability to eat, drink, or take your medication. Please return if you have any worsening in your condition or if you have any other concerns.    If you had radiology exams like an XRAY or CT in the emergency Department the interpreation on them may be preliminary - there may be less time sensitive findings on the reports please obtain these reports within 24 hours from the hospital or by using your out on your mobile phone to access records.  Bring these to your primary care doctor and/or specialist for further review of incidental findings.    Please review any LAB WORK from your visit today with your primary care physician.    Please follow-up with your neurologist in [facility] your concern about feeling dizzy after taking your lamotrigine/Lamictal.  Your welcome to return emergency department at any time if you have worsening symptoms.

## 2022-09-24 NOTE — FIRST PROVIDER EVALUATION
"Medical screening examination initiated.  I have conducted a focused provider triage encounter, findings are as follows:    Brief history of present illness:  72-year-old male presents emergency room with complaints of onset of dizziness since this morning.  He states symptoms seemed to be little better right now.  He did have similar symptoms about 1 week ago and was seen at [facility].  He recalls having a low sodium and ended up leaving AMA.      Vitals:    09/24/22 1142   BP: 134/73   Pulse: 85   Resp: 18   Temp: 98.1 °F (36.7 °C)   TempSrc: Oral   SpO2: 98%   Weight: 45.4 kg (100 lb)   Height: 5' 4" (1.626 m)       Pertinent physical exam:  Patient presents in wheelchair.  No respiratory distress.      Brief workup plan:  Labs, UA. EKG, CXR    Preliminary workup initiated; this workup will be continued and followed by the physician or advanced practice provider that is assigned to the patient when roomed.  "

## 2022-09-24 NOTE — ED PROVIDER NOTES
Encounter Date: 9/24/2022    SCRIBE #1 NOTE: I, Quinten Méndez, am scribing for, and in the presence of,  Nguyễn Melendez MD. I have scribed the following portions of the note - Other sections scribed: HPI, ROS, PE, EKG.     History     Chief Complaint   Patient presents with    Weakness     Complaining of weakness, dizziness since this morning; denies other symptoms     73 y/o male with a history of seizures, osteoporosis, and GERD presents to the ED with dizziness that onset this morning after taking his seizure medication. Pt says that he feels like the room is spinning after 30-45 minutes pass from the time that he takes his medicine. This problem is not new, with the most recent occurrence being on Friday. The episodes of dizziness last about 2-3 hours, and he says his dizziness has improved since onset. Pt says he has no history of DM or HTN. His last seizure was 6 years ago. His PCP is Dr. Peng Russell and his neurologist is Dr. Charles Akbar. His last appointment with Dr. Akbar was in August.    The history is provided by the patient. No  was used.   Illness   Illness onset: this morning. Episode frequency: every time he takes his seizure medication. Progression since onset: improved. Pertinent negatives include no fever, no abdominal pain, no constipation, no diarrhea, no nausea, no vomiting, no congestion, no ear discharge, no ear pain, no headaches, no rhinorrhea, no sore throat, no neck pain, no cough, no shortness of breath, no pain and no eye redness.   Review of patient's allergies indicates:  No Known Allergies  Past Medical History:   Diagnosis Date    BPH with obstruction/lower urinary tract symptoms     Cachexia     Depressive disorder     GERD (gastroesophageal reflux disease)     Osteoporosis     Vitamin D deficiency      Past Surgical History:   Procedure Laterality Date    BACK SURGERY      CATARACT EXTRACTION      Electroencephalogram (EEG); including recording awake and  drowsy (08/10/2017)      Extracapsular cataract removal with insertion of intraocular lens prosthesis (1 stage procedure), manual or mechanical technique (eg, irrigation and aspiration or phacoemulsification). (04/24/2013)      NOSE SURGERY      OPEN REDUCTION NASAL FRACTURE      ORIF Sacroiliac Joint (Left) (08/05/2016)      Repair Scalp Subcutaneous Tissue and Fascia, Open Approach (11/11/2020)      TONSILLECTOMY       Family History   Problem Relation Age of Onset    Seizures Mother     Heart disease Father     Cancer Father     Lymphoma Father     Hypertension Sister     Heart disease Brother     Hypertension Brother     Alzheimer's disease Brother     Heart failure Father     Heart failure Brother     Hypertension Brother      Social History     Tobacco Use    Smoking status: Never    Smokeless tobacco: Never   Substance Use Topics    Alcohol use: Not Currently    Drug use: Never     Review of Systems   Constitutional:  Negative for chills, fatigue and fever.   HENT:  Negative for congestion, ear discharge, ear pain, nosebleeds, rhinorrhea and sore throat.    Eyes:  Negative for pain, redness and visual disturbance.   Respiratory:  Negative for cough, chest tightness and shortness of breath.    Cardiovascular:  Negative for chest pain and leg swelling.   Gastrointestinal:  Negative for abdominal pain, blood in stool, constipation, diarrhea, nausea and vomiting.   Genitourinary:  Negative for dysuria and hematuria.   Musculoskeletal:  Negative for arthralgias, joint swelling, myalgias and neck pain.   Skin:  Negative for color change, pallor and wound.   Neurological:  Positive for dizziness. Negative for weakness, light-headedness, numbness and headaches.     Physical Exam     Initial Vitals [09/24/22 1142]   BP Pulse Resp Temp SpO2   134/73 85 18 98.1 °F (36.7 °C) 98 %      MAP       --         Physical Exam    Nursing note and vitals reviewed.  Constitutional: He appears well-developed and well-nourished. He  is not diaphoretic. No distress.   Patient is very thin   HENT:   Head: Normocephalic and atraumatic.   Right Ear: External ear normal.   Left Ear: External ear normal.   Nose: Nose normal.   Mouth/Throat: Oropharynx is clear and moist.   Eyes: Conjunctivae and EOM are normal. Pupils are equal, round, and reactive to light. Right eye exhibits no discharge. Left eye exhibits no discharge.   Neck: Neck supple. No tracheal deviation present.   Normal range of motion.  Cardiovascular:  Normal rate, regular rhythm, normal heart sounds and intact distal pulses.     Exam reveals no gallop and no friction rub.       No murmur heard.  Pulmonary/Chest: Breath sounds normal. No stridor. No respiratory distress. He has no wheezes. He has no rhonchi. He has no rales. He exhibits no tenderness.   Abdominal: Abdomen is soft. Bowel sounds are normal. He exhibits no distension and no mass. There is no abdominal tenderness. There is no guarding.   Musculoskeletal:         General: No tenderness or edema. Normal range of motion.      Cervical back: Normal range of motion and neck supple.      Comments: 1+ pitting edema in bilateral lower extremities     Neurological: He is alert and oriented to person, place, and time. No cranial nerve deficit or sensory deficit.   No focal deficits; finger-to-nose intact  Equal sensation in all four extremities   Skin: Skin is warm and dry. Capillary refill takes less than 2 seconds. No rash noted. No erythema. No pallor.       ED Course   Procedures  Labs Reviewed   COMPREHENSIVE METABOLIC PANEL - Abnormal; Notable for the following components:       Result Value    Sodium Level 129 (*)     Chloride 90 (*)     Carbon Dioxide 32 (*)     Creatinine 0.63 (*)     Globulin 2.3 (*)     All other components within normal limits   URINALYSIS, REFLEX TO URINE CULTURE - Abnormal; Notable for the following components:    Appearance, UA Turbid (*)     All other components within normal limits   CBC WITH  DIFFERENTIAL - Abnormal; Notable for the following components:    RBC 4.29 (*)     Hgb 13.5 (*)     Hct 39.1 (*)     MCH 31.5 (*)     All other components within normal limits   TROPONIN I - Normal   URINALYSIS, MICROSCOPIC - Normal   CBC W/ AUTO DIFFERENTIAL    Narrative:     The following orders were created for panel order CBC auto differential.  Procedure                               Abnormality         Status                     ---------                               -----------         ------                     CBC with Differential[[id number]]        Abnormal            Final result                 Please view results for these tests on the individual orders.     EKG Readings: (Independently Interpreted)   Initial Reading: No STEMI. Rhythm: Normal Sinus Rhythm. Heart Rate: 80.   Time: 12:41  QTC: 428msec  Good R wave progression  Significant baseline artifact in LI and AVL, severely limiting interpretation     Imaging Results              CT Head Without Contrast (Final result)  Result time 09/24/22 14:19:03      Final result by Thomas Jewell MD (09/24/22 14:19:03)                   Impression:      No acute abnormality seen      Electronically signed by: Thomas Jewell  Date:    09/24/2022  Time:    14:19               Narrative:    EXAMINATION:  CT HEAD WITHOUT CONTRAST    CLINICAL HISTORY:  Mental status change, unknown cause;    TECHNIQUE:  Multiple axial images were obtained from the base of the brain to the vertex without contrast administration.  Sagittal and coronal reconstructions were performed..Automatic exposure control is utilized to reduce patient radiation exposure.    COMPARISON:  11/12/2020    FINDINGS:  There is no intracranial mass or lesion seen.  No hemorrhage is seen.  No infarct is seen.  The ventricles and basilar cisterns appear normal.  Brain parenchyma appears grossly unremarkable.    Posterior fossa appears normal.  The calvarium is intact.  The paranasal sinuses  appear grossly unremarkable.                                       X-Ray Chest PA And Lateral (Final result)  Result time 09/24/22 12:44:22      Final result by Zaire Copeland MD (09/24/22 12:44:22)                   Impression:      No acute pulmonary process appreciated.      Electronically signed by: Zaire Copeland  Date:    09/24/2022  Time:    12:44               Narrative:    EXAMINATION:  XR CHEST PA AND LATERAL    CLINICAL HISTORY:  Weakness    TECHNIQUE:  PA and lateral radiographs of the chest    COMPARISON:  Radiography 09/21/2022    FINDINGS:  Normal cardiac silhouette. The lungs are well-inflated. No consolidation identified. No pleural effusion or discernible pneumothorax. Few compression deformities near the thoracolumbar junction.  Gaseous distension of the transverse colon.                                       Medications - No data to display  Medical Decision Making:   Initial Assessment:   Patient presents with dizziness reportedly after taking his lamotrigine.  Denies any symptoms at this time.  Differential Diagnosis:   Medication adverse effect, vertigo, benign positional vertigo, CVA  Clinical Tests:   Lab Tests: Ordered and Reviewed  Radiological Study: Ordered and Reviewed  Medical Tests: Reviewed and Ordered  ED Management:  Patient is awake alert oriented.  He is very thin but otherwise well appearing.  He has no focal neuro deficits.  He is able to ambulate here in the emergency department back and forth to the bathroom unassisted.  On re-evaluation he denies any complaints he is requesting discharge home.  CT head unremarkable.  Labs within normal limits.  Denies any complaints.  No focal neuro deficit.  Finger-nose intact.  Suitable for discharge home at this time. Return precautions given.  Questions invited, questions answered to the best my ability.  Patient discharged home condition stable.          Scribe Attestation:   Scribe #1: I performed the above scribed service and the  documentation accurately describes the services I performed. I attest to the accuracy of the note.    Attending Attestation:           Physician Attestation for Scribe:  Physician Attestation Statement for Scribe #1: I, Nguyễn Melendez MD, reviewed documentation, as scribed by Quinten Méndez in my presence, and it is both accurate and complete.                        Clinical Impression:   Final diagnoses:  [R53.1] Weakness  [R42] Dizziness (Primary)        ED Disposition Condition    Discharge Stable          ED Prescriptions    None       Follow-up Information       Follow up With Specialties Details Why Contact Info    Peng Russell Jr., MD Family Medicine Call in 1 week  [address] [place] [id number]  [phone]               Nguyễn Melendez MD  09/24/22 5442

## 2022-09-28 PROBLEM — R62.7 FAILURE TO THRIVE IN ADULT: Status: ACTIVE | Noted: 2022-01-01

## 2022-09-28 NOTE — FIRST PROVIDER EVALUATION
"Medical screening examination initiated.  I have conducted a focused provider triage encounter, findings are as follows:    Chief Complaint   Patient presents with    Fatigue     Pt c/o weakness x 1 hour ago.  Pt states happens frequently after taking seizure meds x 4 years.  Pt states weight loss.        Brief history of present illness: 72 y.o. male presents to the ED with weakness onset 1 hour ago. States weight loss as well.     Vitals:    09/28/22 1044   BP: (!) 110/90   Pulse: 70   Resp: 18   Temp: 96.8 °F (36 °C)   SpO2: 98%   Weight: 40.8 kg (90 lb)   Height: 5' 4" (1.626 m)     Pertinent physical exam:  Awake, alert, non-labored respirations    Brief workup plan:  labs, EKG    Preliminary workup initiated; this workup will be continued and followed by the physician or advanced practice provider that is assigned to the patient when roomed.  "

## 2022-09-28 NOTE — H&P
[facility] Hospital Medicine History & Physical Examination       Patient Name: Ricardo Cristobal  MRN: [id number]  Patient Class: Emergency   Admission Date: 9/28/2022   Admitting Physician: Tala Patel PA-C  Length of Stay: 0  Attending Physician: Dr. Bryson Tracy   Primary Care Provider: Peng Russell Jr, MD  Face-to-Face encounter date: 09/28/2022  Code Status:Code Status Discussion Note   Chief Complaint: Fatigue (Pt c/o weakness x 1 hour ago.  Pt states happens frequently after taking seizure meds x 4 years.  Pt states weight loss.   )        Patient information was obtained from patient, patient's family, past medical records and ER records.     HISTORY OF PRESENT ILLNESS:   Ricardo Cristobal is a 72 y.o. male with a past medical history of seizure disorder, osteoporosis, BPH, GERD, vitamin-D deficiency, and depression presented to [facility] on 9/28/2022 for weakness and failure to thrive.  Patient reports worsening generalized weakness for the past month.  Patient states intermittent constipation and diarrhea.  Patient reports he uses a wheelchair at home.  Patient reports he normally stands in the shower, but this week he has had to sit in the shower. Patient lives at home and has family members check on him twice a week.  Brother-in-law states that for the past month patient has been unable to go on outings secondary to weakness. Brother-in-law also reports that patient has had to call EMS several times in the past month to help him back into his wheelchair.  Family members report that they have been trying to get patient placement.    Patient also reports decreased appetite, nausea, and intermittent constipation/diarrhea.  Patient reports he 3 meals a day, sometimes only small bites of food.  Last bowel movement was 5 days ago.    In ED initial vital signs included blood pressure 110/90, pulse 70, respirations 18, temperature 36° C, and SpO2 98%.  Labs revealed RBC of 4.21 hemoglobin  "13.1, hematocrit 38.2, sodium of 128, glucose 68, and troponin 0.011.  EKG showed normal sinus rhythm with rate of 66.Patient was admitted to hospital medicine service for further medical management.     PAST MEDICAL HISTORY:   Seizure disorder  Osteoporosis  BPH  GERD   Vitamin-D deficiency   Depression    PAST SURGICAL HISTORY:     Past Surgical History:   Procedure Laterality Date    BACK SURGERY      CATARACT EXTRACTION      Electroencephalogram (EEG); including recording awake and drowsy (08/10/2017)      Extracapsular cataract removal with insertion of intraocular lens prosthesis (1 stage procedure), manual or mechanical technique (eg, irrigation and aspiration or phacoemulsification). (04/24/2013)      NOSE SURGERY      OPEN REDUCTION NASAL FRACTURE      ORIF Sacroiliac Joint (Left) (08/05/2016)      Repair Scalp Subcutaneous Tissue and Fascia, Open Approach (11/11/2020)      TONSILLECTOMY         ALLERGIES:   Patient has no known allergies.    FAMILY HISTORY:   Reviewed and negative    SOCIAL HISTORY:     Social History     Tobacco Use    Smoking status: Never    Smokeless tobacco: Never   Substance Use Topics    Alcohol use: Not Currently        HOME MEDICATIONS:     Prior to Admission medications    Medication Sig Start Date End Date Taking? Authorizing Provider   alendronate (FOSAMAX) 70 MG tablet See Instructions, TAKE 1 TABLET EVERY WEEK, # 12 tab(s), 3 Refill(s), Pharmacy: [facility], 162, cm, Height/Length Dosing, 01/17/22 13:50:00 CST, 48.9, kg, Weight Dosing, 01/17/22 13:50:00 CST 7/19/22   Peng Russell Jr., MD   BD HERIBERTO 2ND GEN PEN NEEDLE 32 gauge x 5/32" Ndle  9/8/22   Historical Provider   calcium carbonate (OS-HENRI) 600 mg calcium (1,500 mg) Tab Take 600 mg by mouth once.    Historical Provider   ergocalciferol (ERGOCALCIFEROL) 50,000 unit Cap Take 50,000 Int'l Units by mouth. 12/8/21   Historical Provider   gabapentin (NEURONTIN) 300 MG capsule TAKE 1 CAPSULE BY MOUTH AT " BEDTIME 6/14/22   Peng Russell Jr., MD   lamoTRIgine (LAMICTAL) 150 MG Tab   See Instructions, TAKE 1 TABLET TWICE DAILY, # 60 tab(s), 0 Refill(s), Pharmacy: [facility]/pharmacy #[id number], 162, cm, Height/Length Dosing, 02/24/21 15:20:00 CST, 51.4, kg, Weight Dosing, 02/24/21 15:20:00 CST 7/23/21   Historical Provider   latanoprost 0.005 % ophthalmic solution  3/15/22   Historical Provider   melatonin 10 mg Tab Take by mouth.    Historical Provider   mirtazapine (REMERON) 15 MG tablet  3/28/22   Historical Provider   multivitamin (ONE DAILY MULTIVITAMIN) per tablet Take 1 tablet by mouth once daily.    Historical Provider   mupirocin (BACTROBAN) 2 % ointment Apply topically 3 (three) times daily. 9/22/22   Peng Russell Jr., MD   OXcarbazepine (TRILEPTAL) 300 MG Tab Take 150 mg by mouth 2 (two) times daily.    Historical Provider   pantoprazole (PROTONIX) 40 MG tablet  8/16/22   Historical Provider   tamsulosin (FLOMAX) 0.4 mg Cap  5/19/22   Historical Provider       REVIEW OF SYSTEMS:   Except as documented, all other systems reviewed and negative     PHYSICAL EXAM:     VITAL SIGNS: 24 HRS MIN & MAX LAST   Temp  Min: 96.8 °F (36 °C)  Max: 96.8 °F (36 °C) 96.8 °F (36 °C)   BP  Min: 110/90  Max: 110/90 (!) 110/90     Pulse  Min: 70  Max: 70  70   Resp  Min: 18  Max: 18 18   SpO2  Min: 98 %  Max: 98 % 98 %       General appearance:  Frail, cachectic male in no apparent distress.  HEENNT: Atraumatic head. Moist mucous membranes of oral cavity.   Lungs: Clear to auscultation bilaterally.   Heart: Regular rate and rhythm.    Abdomen: Soft, non-distended, non-tender. Bowel sounds are normal.   Extremities: Muscle wasting. No deformities.   Skin: No Rash. Warm and dry.   Neuro: Awake, alert, and oriented. Generalized weakness  Psych/mental status: Appropriate mood and affect. Responds appropriately to questions.     LABS AND IMAGING:     Recent Labs   Lab 09/24/22  1230 09/28/22  1217   WBC 5.7 4.9   RBC 4.29* 4.21*   HGB 13.5*  13.1*   HCT 39.1* 38.2*   MCV 91.1 90.7   MCH 31.5* 31.1*   MCHC 34.5 34.3   RDW 13.0 12.9    259   MPV 9.0 9.1       Recent Labs   Lab 09/24/22  1230 09/28/22  1217   * 128*   K 4.5 4.4   CO2 32* 30   BUN 13.0 20.4   CREATININE 0.63* 0.63*   CALCIUM 9.1 8.7*   ALBUMIN 3.5 3.3*   ALKPHOS 99 107   ALT 21 24   AST 26 31   BILITOT 0.4 0.4       Microbiology Results (last 7 days)       ** No results found for the last 168 hours. **             CT Head Without Contrast  Narrative: EXAMINATION:  CT HEAD WITHOUT CONTRAST    CLINICAL HISTORY:  Mental status change, unknown cause;    TECHNIQUE:  Multiple axial images were obtained from the base of the brain to the vertex without contrast administration.  Sagittal and coronal reconstructions were performed..Automatic exposure control is utilized to reduce patient radiation exposure.    COMPARISON:  11/12/2020    FINDINGS:  There is no intracranial mass or lesion seen.  No hemorrhage is seen.  No infarct is seen.  The ventricles and basilar cisterns appear normal.  Brain parenchyma appears grossly unremarkable.    Posterior fossa appears normal.  The calvarium is intact.  The paranasal sinuses appear grossly unremarkable.  Impression: No acute abnormality seen    Electronically signed by: Thomas Jewell  Date:    09/24/2022  Time:    14:19  X-Ray Chest PA And Lateral  Narrative: EXAMINATION:  XR CHEST PA AND LATERAL    CLINICAL HISTORY:  Weakness    TECHNIQUE:  PA and lateral radiographs of the chest    COMPARISON:  Radiography 09/21/2022    FINDINGS:  Normal cardiac silhouette. The lungs are well-inflated. No consolidation identified. No pleural effusion or discernible pneumothorax. Few compression deformities near the thoracolumbar junction.  Gaseous distension of the transverse colon.  Impression: No acute pulmonary process appreciated.    Electronically signed by: Zaire Copeland  Date:    09/24/2022  Time:    12:44        ASSESSMENT & PLAN:    Assessment:  Generalized weakness  Hyponatremia  Failure to thrive  Osteoporosis  BPH  GERD   Vitamin-D deficiency   Depression    Plan:  IV normal saline @ 100 mL/hr  Urine sodium, creatinine, osmolality; serum osmolality pending   Lamotrigine level, TSH, T3/T4, Thyrotropin receptor antibody pending   Consult case management for placement  PT/OT evaluation   Continue appropriate home medications  Labs in AM     VTE Prophylaxis: will be placed on Heparin/Lovenox/ Xarelto/ SCD for DVT prophylaxis and will be advised to be as mobile as possible and sit in a chair as tolerated      __________________________________________________________________________  INPATIENT LIST OF MEDICATIONS     Scheduled Meds:   sodium chloride 0.9%  1,000 mL Intravenous ED 1 Time     Continuous Infusions:  PRN Meds:.      IAbebe PA-C, have reviewed and discussed the case with Dr. Cisneros admitting provider for patient encounter.  Please see the following addendum for further assessment and plan from there attending MD.    09/28/2022    ________________________________________________________________________________    MD Addendum:  I, Dr. Bryson Cisneros admitting provider for patient encounter. assumed care of this patient today at  438pm  For the patient encounter, I performed the substantive portion of the visit, I reviewed the PA documentation, treatment plan, and medical decision making.  I had face to face time with this patient     Chief Complaint: Fatigue        HPI   72 y.o. male with a past medical history of seizure disorder, osteoporosis, BPH, GERD, vitamin-D deficiency, and depression presented to [facility] on 9/28/2022 for weakness and failure to thrive.  Patient reports worsening generalized weakness for the past month.  Patient states intermittent constipation and diarrhea.  Patient reports he uses a wheelchair at home.  Patient reports he normally stands in the shower, but this week he has had to sit in the shower.  Patient lives at home and has family members check on him twice a week.  Brother-in-law states that for the past month patient has been unable to go on outings secondary to weakness. Brother-in-law also reports that patient has had to call EMS several times in the past month to help him back into his wheelchair.  Family members report that they have been trying to get patient placement.  Patient also reports decreased appetite, nausea, and intermittent constipation/diarrhea.  Patient reports he 3 meals a day, sometimes only small bites of food.  Last bowel movement was 5 days ago.  Patient was admitted to hospital medicine service for further medical management.       vitalS  blood pressure 110/90, pulse 70, respirations 18, temperature 36° C, and SpO2 98%.      Labs    hemoglobin 13.1, hematocrit 38.2, sodium of 128, glucose 68, and troponin 0.011.      IMAGING  EKG showed normal sinus rhythm with rate of 66.      Assessment:  Generalized weakness  Hyponatremia  Failure to thrive  Osteoporosis  BPH  GERD   Vitamin-D deficiency   Depression    Plan:  Admit   IV normal saline @ 100 mL/hr  Urine sodium, creatinine, osmolality; serum osmolality pending   Lamotrigine level, TSH, T3/T4, Thyrotropin receptor antibody pending   Consult case management for placement  PT/OT evaluation   Continue appropriate home medications  Labs in AM     VTE Prophylaxis: will be placed on Heparin/Lovenox/ Xarelto/ SCD for DVT prophylaxis and will be advised to be as mobile as possible and sit in a chair as tolerated      Discharge Planning and Disposition: No mobility needs. Ambulating well. Good social support system.   Anticipated discharge    All diagnosis and differential diagnosis have been reviewed; assessment and plan has been documented; I have personally reviewed the labs and test results that are presently available; I have reviewed the patients medication list; I have reviewed the consulting providers response and  recommendations. I have reviewed or attempted to review medical records based upon their availability.    All of the patient and family questions have been addressed and answered. Patient's is agreeable to the above stated plan. I will continue to monitor closely and make adjustments to medical management as needed.      No admitting provider for patient encounter.  09/28/2022

## 2022-09-28 NOTE — ED PROVIDER NOTES
Encounter Date: 9/28/2022       History     Chief Complaint   Patient presents with    Fatigue     Pt c/o weakness x 1 hour ago.  Pt states happens frequently after taking seizure meds x 4 years.  Pt states weight loss.        72-year-old male presents to ED for evaluation generalized weakness after taking his seizure medication this morning.  Patient reports that he took his Lamictal and then he got weak causing him to be unable to stand up.  States that this often happens.  Patient reports that he has been having increasing weakness over the last year.  Has been losing weight.  Reports that he has been trying to get in to nursing home but has been unable.    The history is provided by the patient and a relative.   Review of patient's allergies indicates:  No Known Allergies  Past Medical History:   Diagnosis Date    BPH with obstruction/lower urinary tract symptoms     Cachexia     Depressive disorder     GERD (gastroesophageal reflux disease)     Osteoporosis     Vitamin D deficiency      Past Surgical History:   Procedure Laterality Date    BACK SURGERY      CATARACT EXTRACTION      Electroencephalogram (EEG); including recording awake and drowsy (08/10/2017)      Extracapsular cataract removal with insertion of intraocular lens prosthesis (1 stage procedure), manual or mechanical technique (eg, irrigation and aspiration or phacoemulsification). (04/24/2013)      NOSE SURGERY      OPEN REDUCTION NASAL FRACTURE      ORIF Sacroiliac Joint (Left) (08/05/2016)      Repair Scalp Subcutaneous Tissue and Fascia, Open Approach (11/11/2020)      TONSILLECTOMY       Family History   Problem Relation Age of Onset    Seizures Mother     Heart disease Father     Cancer Father     Lymphoma Father     Hypertension Sister     Heart disease Brother     Hypertension Brother     Alzheimer's disease Brother     Heart failure Father     Heart failure Brother     Hypertension Brother      Social History     Tobacco Use    Smoking  status: Never    Smokeless tobacco: Never   Substance Use Topics    Alcohol use: Not Currently    Drug use: Never     Review of Systems   Constitutional:  Positive for fatigue. Negative for chills and fever.   Respiratory:  Negative for shortness of breath.    Cardiovascular:  Negative for chest pain.   Gastrointestinal:  Negative for abdominal pain, diarrhea, nausea and vomiting.   Genitourinary:  Negative for dysuria.   Musculoskeletal:  Negative for back pain and myalgias.   Skin:  Negative for rash.   Neurological:  Positive for dizziness and weakness. Negative for seizures, light-headedness and headaches.   Hematological:  Does not bruise/bleed easily.   All other systems reviewed and are negative.    Physical Exam     Initial Vitals [09/28/22 1044]   BP Pulse Resp Temp SpO2   (!) 110/90 70 18 96.8 °F (36 °C) 98 %      MAP       --         Physical Exam    Nursing note and vitals reviewed.  Constitutional: He appears cachectic. He is cooperative. He has a sickly appearance.   HENT:   Head: Normocephalic and atraumatic.   Right Ear: External ear normal.   Left Ear: External ear normal.   Eyes: Conjunctivae are normal. Pupils are equal, round, and reactive to light.   Neck: Neck supple.   Normal range of motion.  Cardiovascular:  Normal rate, regular rhythm and normal heart sounds.           Pulmonary/Chest: Breath sounds normal. No respiratory distress. He has no wheezes. He has no rhonchi. He has no rales.   Abdominal: Abdomen is soft. Bowel sounds are normal. There is no abdominal tenderness.   Musculoskeletal:         General: Normal range of motion.      Cervical back: Normal range of motion and neck supple.     Neurological: He is alert and oriented to person, place, and time. GCS score is 15. GCS eye subscore is 4. GCS verbal subscore is 5. GCS motor subscore is 6.   Skin: Skin is warm and dry. Capillary refill takes less than 2 seconds.   Psychiatric: He has a normal mood and affect.       ED Course    Procedures  Labs Reviewed   COMPREHENSIVE METABOLIC PANEL - Abnormal; Notable for the following components:       Result Value    Sodium Level 128 (*)     Chloride 91 (*)     Glucose Level 68 (*)     Creatinine 0.63 (*)     Calcium Level Total 8.7 (*)     Protein Total 5.5 (*)     Albumin Level 3.3 (*)     Globulin 2.2 (*)     All other components within normal limits   URINALYSIS, REFLEX TO URINE CULTURE - Abnormal; Notable for the following components:    Appearance, UA Cloudy (*)     Protein, UA Trace (*)     All other components within normal limits   CBC WITH DIFFERENTIAL - Abnormal; Notable for the following components:    RBC 4.21 (*)     Hgb 13.1 (*)     Hct 38.2 (*)     MCH 31.1 (*)     All other components within normal limits   TROPONIN I - Normal   URINALYSIS, MICROSCOPIC - Normal   CBC W/ AUTO DIFFERENTIAL    Narrative:     The following orders were created for panel order CBC auto differential.  Procedure                               Abnormality         Status                     ---------                               -----------         ------                     CBC with Differential[[id number]]        Abnormal            Final result                 Please view results for these tests on the individual orders.   THYROTROPIN RECEPTOR ANTIBODY   LAMOTRIGINE LEVEL   TSH   T4, FREE   T3,FREE ([facility] ONLY)   POCT GLUCOSE     EKG Readings: (Independently Interpreted)   Initial Reading: No STEMI. Rhythm: Normal Sinus Rhythm. Heart Rate: 60. Ectopy: No Ectopy. Conduction: Normal. ST Segments: Normal ST Segments. T Waves: Normal. Clinical Impression: Normal Sinus Rhythm   ECG Results              EKG 12-lead (Final result)  Result time 09/28/22 12:40:24      Final result by Interface, Lab [facility] (09/28/22 12:40:24)                   Narrative:    Test Reason : R53.1,    Vent. Rate : 066 BPM     Atrial Rate : 066 BPM     P-R Int : 152 ms          QRS Dur : 084 ms      QT Int : 428 ms       P-R-T Axes :  073 076 077 degrees     QTc Int : 448 ms    Normal sinus rhythm  Baseline Artifact  Abnormal ECG  Confirmed by Gabe Sorensen MD ([id number]) on 9/28/2022 12:40:16 PM    Referred By: NAIF   SELF           Confirmed By:Gabe Sorensen MD                                  Imaging Results    None          Medications   0.9%  NaCl infusion (has no administration in time range)     Medical Decision Making:   ED Management:  72-year-old male presents to ED for evaluation of generalized weakness after taking his seizure medication earlier today.  Patient reports that he has been having several episodes.  Reports has been getting weaker over the last year.  Has been losing weight.  Patient states that he is on medicine to help improve his sodium.  Seen here for same 4 days ago.  States that he is unable to get around at home and family is unable to help him.  Patient requesting some type of placement for possible rehab or skilled nursing facility.  Discussed case with hospitalist will admit.  Discussed case with ED attending Dr. Duffy, he had face-to-face encounter with patient.                        Clinical Impression:   Final diagnoses:  [R53.1] Weakness  [R62.7] Failure to thrive in adult        ED Disposition Condition    Admit                 PAMELA Biggs  09/28/22 3089

## 2022-09-29 NOTE — PLAN OF CARE
Spoke to patient about discharge plan he is unrealistic in thinking he will go to assisted living. Explained what to expect with assisted living he was unaware that the cost was so high or that he he wouldn't be provided 24/7 care.He then states that his family can assist him Pointed out that this is not different from his current home situation. He has been to SNF before at [facility] he is unsure if he wants to return there. Discussed possibility of going to [facility] for SNF with the possibility of transitioning to their assisted living if he can afford it. He wants to discuss with his family

## 2022-09-29 NOTE — PROGRESS NOTES
Inpatient Nutrition Assessment    Admit Date: 9/28/2022   Total duration of encounter: 1 day     Nutrition Recommendation/Prescription     Encouraged small and frequent high calorie/protein meals as tolerated.  Change supplement to vanilla Boost (provides 240 kcal, 10 g protein per serving).  Antacid and antiemetics as medically feasible.    Communication of Recommendations: reviewed with patient/caregiver    Nutrition Assessment     Malnutrition Assessment/Nutrition-Focused Physical Exam    Malnutrition in the context of chronic illness  Degree of Malnutrition: severe malnutrition  Energy Intake: < 75% of estimated energy requirement for >/= 1 month  Interpretation of Weight Loss: >7.5% in 3 months  Body Fat: severe depletion  Area of Body Fat Loss: orbital region , upper arm region - triceps / biceps, and thoracic and lumbar region - ribs, lower back, midaxillary line  Muscle Mass Loss: severe depletion  Area of Muscle Mass Loss: temple region - temporalis muscle, clavicle bone region - pectoralis major, deltoid, trapezius muscles, clavicle and acromion bone region - deltoid muscle, and scapular bone region - trapezius, supraspinus, infraspinus muscles  Fluid Accumulation: does not meet criteria  Edema: does not meet criteria  Reduced  Strength: unable to obtain  A minimum of two characteristics is recommended for diagnosis of either severe or non-severe malnutrition.    Chart Review    Reason Seen: continuous nutrition monitoring    Diagnosis:  Adult failure to thrive  Generalized weakness, cachexia  Hypovolemic hyponatremia    Relevant Medical History: seizure disorder, osteoporosis, BPH, GERD, vitamin-D deficiency, and depression     Nutrition-Related Medications: 0.9NaCl @ 100ml/hr, vit D, famotidine  Calorie Containing IV Medications: no significant kcals from medications at this time    Nutrition-Related Labs:  9/29/22 Na 128, Cl 96, Ca 8.3, Total pro 4.9, Alb 2.9    Diet/PN Order: Diet heart  "healthy  Oral Supplement Order: Boost Plus  Tube Feeding Order: none at this time  Appetite/Oral Intake: fair/25-50% of meals  Factors Affecting Nutritional Intake: constipation, decreased appetite, early satiety, nausea, and reflux  Food/Latter-day/Cultural Preferences: none reported    Skin Integrity: abrasion (Abrasion to back and buttock from previous fall at home)  Wound(s):   none documented    Comments    22 Pt ate about 50% breakfast. States eats small meals; with early satiety and reflux. Requesting to change Boost Plus to Boost original for better tolerance. Reports weight was 130lbs 3 years ago and has dropped to 100lbs within the last year and within the last 3 months dropped to 90lbs (10% wt loss x3 months). With severe muscle and fat loss.     Anthropometrics    Height: 5' 4" (162.6 cm)    Last Weight: 40.8 kg (90 lb) (22 1044)    BMI (Calculated): 15.4  BMI Classification: underweight (BMI less than 18.5)        Ideal Body Weight (IBW), Male: 130 lb     % Ideal Body Weight, Male (lb): 69.23 %                 Usual Body Weight (UBW), k.45 kg  % Usual Body Weight: 90.01     Usual Weight Provided By: patient    Wt Readings from Last 5 Encounters:   22 40.8 kg (90 lb)   22 45.4 kg (100 lb)   22 41 kg (90 lb 6.2 oz)   22 48.5 kg (107 lb)   22 48.9 kg (107 lb 12.9 oz)     Weight Change(s) Since Admission:  Admit Weight: 40.8 kg (90 lb) (22 1044)  22 40.8kg    Estimated Needs    Weight Used For Calorie Calculations: 40.8 kg (89 lb 15.2 oz)  Energy Calorie Requirements (kcal): 1428-1632kcals/d (35-40kcals/kg)  Energy Need Method: Kcal/kg  Weight Used For Protein Calculations: 40.8 kg (89 lb 15.2 oz)  Protein Requirements: 61g/d (1.5g/kg)  Fluid Requirements (mL): 1428fl/d (1ml/kcal)  Temp: 97.4 °F (36.3 °C)       Enteral Nutrition    Patient not receiving enteral nutrition at this time.    Parenteral Nutrition    Patient not receiving parenteral " nutrition support at this time.    Evaluation of Received Nutrient Intake    Calories: not meeting estimated needs  Protein: not meeting estimated needs    Patient Education    Not applicable.    Nutrition Diagnosis     PES: Malnutrition related to multiple medical conditions as evidenced by <75% EER >/=1 month, >7.5% wt loss x3 months, underweight BMI (15.45), severe muscle and fat loss. (new)    Interventions/Goals     Intervention(s): general/healthful diet, commercial beverage, prescription medication, and collaboration with other providers  Goal: Meet greater than 75% of nutritional needs by follow-up. (new)    Monitoring & Evaluation     Dietitian will monitor energy intake, weight change, electrolyte/renal panel, and glucose/endocrine profile.  Nutrition Risk/Follow-Up: high (follow-up in 1-4 days)

## 2022-09-29 NOTE — PLAN OF CARE
Problem: Occupational Therapy  Goal: Occupational Therapy Goal  Description: ST. UB dressing SBA from w/c.  2. LB dressing Min A from w/c.  3. Toileting, bsc t/f SBA with RW.  Outcome: Ongoing, Progressing

## 2022-09-29 NOTE — PLAN OF CARE
09/29/22 1444   Discharge Assessment   Assessment Type Discharge Planning Assessment   Confirmed/corrected address, phone number and insurance Yes   Confirmed Demographics Correct on Facesheet   Source of Information patient   Communicated ROSALIO with patient/caregiver Date not available/Unable to determine   Reason For Admission placement, weakness   Lives With alone   Do you expect to return to your current living situation? No   Do you have help at home or someone to help you manage your care at home? No   Prior to hospitilization cognitive status: Alert/Oriented   Current cognitive status: Alert/Oriented   Walking or Climbing Stairs Difficulty ambulation difficulty, requires equipment;stair climbing difficulty, requires equipment;transferring difficulty, requires equipment   Mobility Management wheelchair or rolling walker   Dressing/Bathing Difficulty none   Home Accessibility stairs to enter home   Number of Stairs, Main Entrance three   Stair Railings, Main Entrance railings safe and in good condition   Equipment Currently Used at Home walker, rolling;wheelchair;cane, straight   Readmission within 30 days? No   Patient currently being followed by outpatient case management? No   Do you currently have service(s) that help you manage your care at home? No   Do you have prescription coverage? Yes   Coverage Medicare   Do you have any problems affording any of your prescribed medications? No   Is the patient taking medications as prescribed? yes   How do you get to doctors appointments? family or friend will provide   Are you on dialysis? No   Do you take coumadin? No   Discharge Plan A Skilled Nursing Facility   Discharge Plan B New Nursing Home placement - [facility] care facility   DME Needed Upon Discharge  none   Discharge Plan discussed with: Patient   Physical Activity   On average, how many days per week do you engage in moderate to strenuous exercise (like a brisk walk)? 0 days   On average, how many  minutes do you engage in exercise at this level? 0 min   Financial Resource Strain   How hard is it for you to pay for the very basics like food, housing, medical care, and heating? Not very   Housing Stability   In the last 12 months, was there a time when you were not able to pay the mortgage or rent on time? N   In the last 12 months, how many places have you lived? 1   In the last 12 months, was there a time when you did not have a steady place to sleep or slept in a shelter (including now)? N   Food Insecurity   Within the past 12 months, you worried that your food would run out before you got the money to buy more. Never true   Within the past 12 months, the food you bought just didn't last and you didn't have money to get more. Never true

## 2022-09-29 NOTE — PT/OT/SLP EVAL
Physical Therapy Evaluation    Patient Name:  Ricardo Cristobal   MRN:  [id number]    Recommendations:     Discharge Recommendations:  nursing facility, skilled   Discharge Equipment Recommendations: none   Barriers to discharge:  medical dx; decreased functional mobility/indep; decreased ability for self care    Assessment:     Ricardo Cristobal is a 72 y.o. male admitted with a medical diagnosis of Failure to thrive in adult.  He presents with the following impairments/functional limitations:  weakness, gait instability, impaired endurance, impaired balance, decreased lower extremity function, impaired functional mobility, impaired self care skills.    Rehab Prognosis: Fair; patient would benefit from acute skilled PT services to address these deficits and reach maximum level of function.    Recent Surgery: * No surgery found *      Plan:     During this hospitalization, patient to be seen 5 x/week to address the identified rehab impairments via gait training, therapeutic activities, therapeutic exercises, wheelchair management/training and progress toward the following goals:    Plan of Care Expires:  10/29/22    Subjective     Chief Complaint: n/a  Patient/Family Comments/goals: to get stronger  Pain/Comfort:  Pain Rating 1:  (sara enamorado stated; c/o stomach aching after eating)    Patients cultural, spiritual, Islam conflicts given the current situation: no    Living Environment:  Pt lives in a [facility] alone; has 3 steps to enter/exit with bilateral railings.  Prior to admission, patients level of function was unable to appropriately take care of himself. Reports had to call EMS x3 last week d/t falling out of w/c. Mobility has been limited for apparently 6 years. He transfers to a w/c. Appears rather sedentary.    Equipment used at home: wheelchair, walker, rolling, bedside commode, shower chair.  DME owned (not currently used): rolling walker, bedside commode, and wheelchair.    Upon discharge, patient will have  assistance from unsure; would greatly benefit from placement beyond this stay-- pt in agreement; had conversation with him and he wanted to go to an assisted living facility but PT/OT explained to him the importance of going to a place where he can receive therapy services.    Objective:     Communicated with NSG prior to session.  Patient found HOB elevated with peripheral IV  upon PT entry to room.    General Precautions: Standard, fall   Orthopedic Precautions:N/A   Braces: N/A  Respiratory Status: Room air    Exams:  Cognitive Exam:  Patient is oriented to Person, Place, and Time  RLE Strength: grossly 4-/5  LLE Strength: grossly 4-/5    Functional Mobility:  Bed Mobility:     Supine to Sit: minimum assistance  Sit to Supine: stand by assistance  Transfers:     Sit to Stand:  minimum assistance and of 2 persons with hand-held assist  Gait: pt able to perform pre-gait by marching in place with B HHA; limited tolerance ax 2/2 generalized deconditioning/weakness    Of note, pt with significantly kyphotic posture with increased head/trunk flexion when sitting and standing. Robi for sitting balance in order to maintain an upright position      AM-PAC 6 CLICK MOBILITY  Total Score:13     Patient left HOB elevated with all lines intact and call button in reach.    GOALS:   Multidisciplinary Problems       Physical Therapy Goals          Problem: Physical Therapy    Goal Priority Disciplines Outcome Goal Variances Interventions   Physical Therapy Goal     PT, PT/OT Ongoing, Progressing     Description: Goals to be met by: 10/29/22    Patient will increase functional independence with mobility by performin. Supine to sit with [place]  2. Sit to supine with [place]  3. Sit to stand transfer with Minimal Assistance  4. Bed to chair transfer with Minimal Assistance using Rolling Walker  5. Wheelchair propulsion x150 feet with Stand-by Assistance using bilateral uppper extremities                          History:     Past Medical History:   Diagnosis Date    BPH with obstruction/lower urinary tract symptoms     Cachexia     Depressive disorder     GERD (gastroesophageal reflux disease)     Osteoporosis     Vitamin D deficiency        Past Surgical History:   Procedure Laterality Date    BACK SURGERY      CATARACT EXTRACTION      Electroencephalogram (EEG); including recording awake and drowsy (08/10/2017)      Extracapsular cataract removal with insertion of intraocular lens prosthesis (1 stage procedure), manual or mechanical technique (eg, irrigation and aspiration or phacoemulsification). (04/24/2013)      NOSE SURGERY      OPEN REDUCTION NASAL FRACTURE      ORIF Sacroiliac Joint (Left) (08/05/2016)      Repair Scalp Subcutaneous Tissue and Fascia, Open Approach (11/11/2020)      TONSILLECTOMY         Time Tracking:     PT Received On: 09/29/22  PT Start Time: 0933     PT Stop Time: 0951  PT Total Time (min): 18 min     Billable Minutes: Evaluation mod      09/29/2022

## 2022-09-29 NOTE — PT/OT/SLP EVAL
Occupational Therapy   Evaluation    Name: Ricardo Cristobal  MRN: [id number]  Admitting Diagnosis:  Failure to thrive in adult  Recent Surgery: * No surgery found *      Recommendations:     Discharge Recommendations: nursing facility, skilled  Discharge Equipment Recommendations:  none  Barriers to discharge:  Decreased caregiver support    Assessment:     Ricardo Cristobal is a 72 y.o. male with a medical diagnosis of Failure to thrive in adult.  He presents with significant kyphosis and deconditioning resulting in difficulty caring for self at home. He reports 3 falls this week requiring EMS to pick him up off the floor. Performance deficits affecting function: weakness, impaired endurance, impaired self care skills, impaired functional mobility, impaired balance, decreased upper extremity function, decreased lower extremity function, impaired skin, impaired joint extensibility, impaired muscle length.  Pt needs placement on d/c.    Rehab Prognosis: Fair; patient would benefit from acute skilled OT services to address these deficits and reach maximum level of function.       Plan:     Patient to be seen 5 x/week, 3 x/week to address the above listed problems via self-care/home management, therapeutic activities, wheelchair management/training  Plan of Care Expires: 10/13/22  Plan of Care Reviewed with: patient    Subjective     Chief Complaint: falls  Patient/Family Comments/goals: to go to assisted living    Occupational Profile:  Living Environment: pt lives alone in a first floor apt with no steps, tub/shower combo  Previous level of function: performing tasks without assist, but with difficulty. Frequent falls  Roles and Routines: ADLs from w/c level  Equipment Used at Home:  wheelchair, walker, rolling, bedside commode, bath bench  Assistance upon Discharge: Insufficient. family can check on him 1-2 times per week and bring groceries, otherwise none    Pain/Comfort:  Pain Rating 1: 0/10    Objective:     Communicated  with: RN after session.  Rec loss air loss mattress for pressure ulcer prevention d/t redness noted at significant bony prominences at spine d/t kyphosis.   Patient found HOB elevated with peripheral IV upon OT entry to room.    General Precautions: Standard, fall (high risk for acquired pressure ulcer; strict pressure ulcer prevention!)   Orthopedic Precautions:N/A   Braces: N/A    Occupational Performance:    Bed Mobility:    Patient completed Supine to Sit with minimum assistance  Patient completed Sit to Supine with minimum assistance    Functional Mobility/Transfers:  Patient completed Sit <> Stand Transfer with minimum assistance  with  hand-held assist   Functional Mobility: min A to march at EOB with B HHA; distance limited by pt fear    Activities of Daily Living:  Upper Body Dressing: moderate assistance .  Lower Body Dressing: moderate assistance .      Physical Exam:  Sit balance: able to sit with CGA with downward gaze, with attempts at cervical extension post LOB noted and Max A needed for sit balance.  Stand balance: min A with B HHA and forward flexed posture; unable to achieve cervical ext in standing    BUE: AROM intact within postural limitations, 3+/5 strength    AMPAC 6 Click ADL:  AMPAC Total Score: 15    Treatment & Education:  Initial eval performed. Recs provided to RN and CM. PUP packet ordered.    Patient left HOB elevated with all lines intact    GOALS:   Multidisciplinary Problems       Occupational Therapy Goals          Problem: Occupational Therapy    Goal Priority Disciplines Outcome Interventions   Occupational Therapy Goal     OT, PT/OT Ongoing, Progressing    Description: ST. UB dressing SBA from w/c.  2. LB dressing Min A from w/c.  3. Toileting, bsc t/f SBA with RW.                       History:     Past Medical History:   Diagnosis Date    BPH with obstruction/lower urinary tract symptoms     Cachexia     Depressive disorder     GERD (gastroesophageal reflux disease)      Osteoporosis     Vitamin D deficiency          Past Surgical History:   Procedure Laterality Date    BACK SURGERY      CATARACT EXTRACTION      Electroencephalogram (EEG); including recording awake and drowsy (08/10/2017)      Extracapsular cataract removal with insertion of intraocular lens prosthesis (1 stage procedure), manual or mechanical technique (eg, irrigation and aspiration or phacoemulsification). (04/24/2013)      NOSE SURGERY      OPEN REDUCTION NASAL FRACTURE      ORIF Sacroiliac Joint (Left) (08/05/2016)      Repair Scalp Subcutaneous Tissue and Fascia, Open Approach (11/11/2020)      TONSILLECTOMY         Time Tracking:     OT Date of Treatment: 09/29/22  OT Start Time: 0934  OT Stop Time: 0955  OT Total Time (min): 21 min    Billable Minutes:Evaluation moderate    9/29/2022

## 2022-09-29 NOTE — PLAN OF CARE
Problem: Physical Therapy  Goal: Physical Therapy Goal  Description: Goals to be met by: 10/29/22    Patient will increase functional independence with mobility by performin. Supine to sit with [place]  2. Sit to supine with [place]  3. Sit to stand transfer with Minimal Assistance  4. Bed to chair transfer with Minimal Assistance using Rolling Walker  5. Wheelchair propulsion x150 feet with Stand-by Assistance using bilateral uppper extremities    Outcome: Ongoing, Progressing

## 2022-09-29 NOTE — PROGRESS NOTES
[facility] Hospital Medicine Progress Note        Chief Complaint: Inpatient Follow-up for FTT    HPI:   Ricardo Cristobal is a 72 y.o. male with a past medical history of seizure disorder, osteoporosis, BPH, GERD, vitamin-D deficiency, and depression presented to [facility] on 9/28/2022 for weakness and failure to thrive.  Patient reports worsening generalized weakness for the past month.  Patient states intermittent constipation and diarrhea.  Patient reports he uses a wheelchair at home.  Patient reports he normally stands in the shower, but this week he has had to sit in the shower. Patient lives at home and has family members check on him twice a week.  Brother-in-law states that for the past month patient has been unable to go on outings secondary to weakness. Brother-in-law also reports that patient has had to call EMS several times in the past month to help him back into his wheelchair.  Family members report that they have been trying to get patient placement.    Patient also reports decreased appetite, nausea, and intermittent constipation/diarrhea.  Patient reports he 3 meals a day, sometimes only small bites of food.  Last bowel movement was 5 days ago.     In ED initial vital signs included blood pressure 110/90, pulse 70, respirations 18, temperature 36° C, and SpO2 98%.  Labs revealed RBC of 4.21 hemoglobin 13.1, hematocrit 38.2, sodium of 128, glucose 68, and troponin 0.011.  EKG showed normal sinus rhythm with rate of 66.Patient was admitted to hospital medicine service for further medical management.     Interval Hx:   Afebrile and hemodynamically stable this morning.  Doing well on room air.  Comfortably resting in the bed.  Tolerating p.o. diet.  Has no new complaints or concerns.  Significantly deconditioned and malnutrition.      Objective/physical exam:  Vitals:    09/28/22 2045 09/28/22 2046 09/28/22 2052 09/28/22 2344   BP:    113/75   Pulse: 68 68 72 82   Resp: 13 17  17 13   Temp:    97.4 °F (36.3 °C)   TempSrc:    Oral   SpO2:   97% 97%   Weight:       Height:         General:  Frail looking, comfortable, not in distress   Respiratory:  Poor inspiratory effort   Cardiovascular: S1, S2, no appreciable murmur  Abdomen: Soft, nontender, BS +  MSK: Warm, no lower extremity edema, no clubbing or cyanosis  Neurologic: Alert and oriented x4, moving all extremities with good strength     Lab Results   Component Value Date     (L) 09/29/2022    K 3.6 09/29/2022    CO2 24 09/29/2022    BUN 21.9 09/29/2022    CREATININE 0.60 (L) 09/29/2022    CALCIUM 8.3 (L) 09/29/2022    EGFRNONAA >60 08/24/2021      Lab Results   Component Value Date    ALT 22 09/29/2022    AST 25 09/29/2022    ALKPHOS 111 09/29/2022    BILITOT 0.3 09/29/2022      Lab Results   Component Value Date    WBC 4.3 (L) 09/29/2022    HGB 12.6 (L) 09/29/2022    HCT 35.8 (L) 09/29/2022    MCV 89.3 09/29/2022     09/29/2022           Medications:   enoxaparin  40 mg Subcutaneous Daily    lamoTRIgine  150 mg Oral BID    tamsulosin  0.4 mg Oral Daily      acetaminophen, acetaminophen, dextrose 10%, dextrose 10%, glucagon (human recombinant), glucose, glucose, hydrALAZINE, melatonin, ondansetron     Assessment/Plan:    Adult failure to thrive  Generalized weakness, cachexia  Hypovolemic hyponatremia    HX:  Depression, vitamin-D deficiency, osteoporosis, BPH, GERD    Plan:   -continue IV hydration.  Encourage p.o. intake.  Monitor sodium.    -add dietary supplements.  Will consider adding mirtazapine or Megace eventually   -PT OT as tolerated   -other home medications reviewed    Lovenox 40  Add pepcid  Labs  Full code        Fermin Bah MD

## 2022-09-29 NOTE — PLAN OF CARE
Problem: Adult Inpatient Plan of Care  Goal: Absence of Hospital-Acquired Illness or Injury  Outcome: Ongoing, Progressing  Goal: Optimal Comfort and Wellbeing  Outcome: Ongoing, Progressing     Problem: Skin Injury Risk Increased  Goal: Skin Health and Integrity  Outcome: Ongoing, Progressing     Problem: Fall Injury Risk  Goal: Absence of Fall and Fall-Related Injury  Outcome: Ongoing, Progressing

## 2022-09-30 NOTE — PLAN OF CARE
Spoke to patient about discharge plan would like to go to [facility] and hopefully can discharge to the [facility] from there

## 2022-09-30 NOTE — PLAN OF CARE
Spoke to patients sister about discharge plan she is concerned that he possibly has cancer he was supposed to have a  prostate bx in July but he did not take the valium prior to procedure because his stomach hurt and he was so anxious that they could not do biopsy it would need to be with anesthesia. She was trying to get him in the [place] independent living paid his deposit but he started to decline and he cannot care for himself. States that after his last stay at SNF he progressed so well he was able to live at home by himself for 2 years. Will suzanne with patient

## 2022-09-30 NOTE — PT/OT/SLP PROGRESS
Physical Therapy  Treatment    Ricardo Cristobal   MRN: [id number]   Admitting Diagnosis: Failure to thrive in adult       PT Start Time: 1400     PT Stop Time: 1425    PT Total Time (min): 25 min       Billable Minutes:  Therapeutic Activity 25    Treatment Type: Treatment  PT/PTA: PTA     PTA Visit Number: 1       General Precautions: Standard, fall  Orthopedic Precautions: N/A   Braces:    Respiratory Status: Room air    Spiritual, Cultural Beliefs, Amish Practices, Values that Affect Care: no    Subjective:  Communicated with NSG prior to session.         Objective:        Functional Mobility:  Bed Mobility:    Supine to sit. Mod A to come to complete upright.    Transfers:  Sit to/from standx 3 bouts. MoD a. Stand pivot bed to/from wc. Mod A. Pt demos a flexed posture in sitting EOB and in chair. Very kyphotic but with cues for back extension pt demos and increased posterior pelvic tilt causing him to slide forward in  the chair. Returned to bed.            AM-PAC 6 CLICK MOBILITY  How much help from another person does this patient currently need?   1 = Unable, Total/Dependent Assistance  2 = A lot, Maximum/Moderate Assistance  3 = A little, Minimum/Contact Guard/Supervision  4 = None, Modified [place]/Independent         AM-PAC Raw Score CMS G-Code Modifier Level of Impairment Assistance   6 % Total / Unable   7 - 9 CM 80 - 100% Maximal Assist   10 - 14 CL 60 - 80% Moderate Assist   15 - 19 CK 40 - 60% Moderate Assist   20 - 22 CJ 20 - 40% Minimal Assist   23 CI 1-20% SBA / CGA   24 CH 0% Independent/ Mod I     Patient left supine with all lines intact and call button in reach.    Assessment:  Ricardo Cristobal is a 72 y.o. male with a medical diagnosis of Failure to thrive in adult     Rehab identified problem list/impairments:      Rehab potential is good.    Activity tolerance: Good    Discharge recommendations:   SNF    Barriers to discharge:      Equipment recommendations:       GOALS:    Multidisciplinary Problems       Physical Therapy Goals          Problem: Physical Therapy    Goal Priority Disciplines Outcome Goal Variances Interventions   Physical Therapy Goal     PT, PT/OT Ongoing, Progressing     Description: Goals to be met by: 10/29/22    Patient will increase functional independence with mobility by performin. Supine to sit with [place]  2. Sit to supine with [place]  3. Sit to stand transfer with Minimal Assistance  4. Bed to chair transfer with Minimal Assistance using Rolling Walker  5. Wheelchair propulsion x150 feet with Stand-by Assistance using bilateral uppper extremities                         PLAN:    Patient to be seen 5 x/week  to address the above listed problems via gait training, therapeutic activities, therapeutic exercises, wheelchair management/training  Plan of Care expires: 10/29/22  Plan of Care reviewed with: patient         2022

## 2022-09-30 NOTE — PROGRESS NOTES
[facility] Hospital Medicine Progress Note        Chief Complaint: Inpatient Follow-up for FTT    HPI:   Ricardo Cristobal is a 72 y.o. male with a past medical history of seizure disorder, osteoporosis, BPH, GERD, vitamin-D deficiency, and depression presented to [facility] on 9/28/2022 for weakness and failure to thrive.  Patient reports worsening generalized weakness for the past month.  Patient states intermittent constipation and diarrhea.  Patient reports he uses a wheelchair at home.  Patient reports he normally stands in the shower, but this week he has had to sit in the shower. Patient lives at home and has family members check on him twice a week.  Brother-in-law states that for the past month patient has been unable to go on outings secondary to weakness. Brother-in-law also reports that patient has had to call EMS several times in the past month to help him back into his wheelchair.  Family members report that they have been trying to get patient placement.    Patient also reports decreased appetite, nausea, and intermittent constipation/diarrhea.  Patient reports he 3 meals a day, sometimes only small bites of food.  Last bowel movement was 5 days ago.     In ED initial vital signs included blood pressure 110/90, pulse 70, respirations 18, temperature 36° C, and SpO2 98%.  Labs revealed RBC of 4.21 hemoglobin 13.1, hematocrit 38.2, sodium of 128, glucose 68, and troponin 0.011.  EKG showed normal sinus rhythm with rate of 66.Patient was admitted to hospital medicine service for further medical management of failure to thrive.    Interval Hx:     Did well overnight.  Hemodynamically stable.  Comfortably resting in the bed.  Tolerating p.o..  Has no new complaints or concerns.  Lab work revealed mild leukopenia, chronic anemia, iron deficiency.  Hyponatremia stable      Objective/physical exam:  Vitals:    09/29/22 1500 09/29/22 2021 09/30/22 0420 09/30/22 0728   BP: 113/66 118/68  (!) 92/56 118/67   BP Location: Right arm Right arm     Patient Position: Lying Lying     Pulse: 81 (!) 59 (!) 54 67   Resp: 18 18 18 18   Temp: 98.2 °F (36.8 °C) 97.4 °F (36.3 °C) 97.6 °F (36.4 °C) 97.5 °F (36.4 °C)   TempSrc:  Oral Oral Oral   SpO2: 98% (!) 93% 98% 97%   Weight:       Height:         General:  Frail looking, comfortable, not in distress   Respiratory:  Poor inspiratory effort   Cardiovascular: S1, S2, no appreciable murmur  Abdomen: Soft, nontender, BS +  MSK: Warm, no lower extremity edema, no clubbing or cyanosis  Neurologic: Alert and oriented x4, moving all extremities with good strength     Lab Results   Component Value Date     (L) 09/30/2022    K 3.8 09/30/2022    CO2 28 09/30/2022    BUN 16.2 09/30/2022    CREATININE 0.59 (L) 09/30/2022    CALCIUM 7.9 (L) 09/30/2022    EGFRNONAA >60 08/24/2021      Lab Results   Component Value Date    ALT 24 09/30/2022    AST 30 09/30/2022    ALKPHOS 102 09/30/2022    BILITOT 0.5 09/30/2022      Lab Results   Component Value Date    WBC 3.2 (L) 09/30/2022    HGB 10.6 (L) 09/30/2022    HCT 31.0 (L) 09/30/2022    MCV 91.4 09/30/2022     09/30/2022           Medications:   enoxaparin  40 mg Subcutaneous Daily    ergocalciferol  50,000 Units Oral Q7 Days    famotidine  20 mg Oral Daily    ferric gluconate (FERRLECIT) IVPB  125 mg Intravenous Daily    lamoTRIgine  150 mg Oral BID    tamsulosin  0.4 mg Oral Daily      acetaminophen, acetaminophen, dextrose 10%, dextrose 10%, glucagon (human recombinant), glucose, glucose, hydrALAZINE, melatonin, ondansetron     Assessment/Plan:    Adult failure to thrive  Generalized weakness, cachexia  Hypovolemic hyponatremia    HX:  Depression, vitamin-D deficiency, osteoporosis, BPH, GERD    Plan:   -continue IV hydration.  Encourage p.o. intake.  Monitor sodium.    -dietary supplements.  Will add Megace e  -PT OT as tolerated   - IV iron for deficiency  -other home medications reviewed     Lovenox  40  pepcid  Labs  Full code    Fermin Bah MD

## 2022-09-30 NOTE — PLAN OF CARE
Problem: Adult Inpatient Plan of Care  Goal: Patient-Specific Goal (Individualized)  Outcome: Ongoing, Progressing  Goal: Absence of Hospital-Acquired Illness or Injury  Outcome: Ongoing, Progressing  Goal: Readiness for Transition of Care  Outcome: Ongoing, Progressing     Problem: Skin Injury Risk Increased  Goal: Skin Health and Integrity  Outcome: Ongoing, Progressing     Problem: Fall Injury Risk  Goal: Absence of Fall and Fall-Related Injury  Outcome: Ongoing, Progressing

## 2022-10-01 NOTE — PLAN OF CARE
Problem: Adult Inpatient Plan of Care  Goal: Plan of Care Review  Outcome: Ongoing, Progressing  Goal: Patient-Specific Goal (Individualized)  Outcome: Ongoing, Progressing  Goal: Absence of Hospital-Acquired Illness or Injury  Outcome: Ongoing, Progressing  Goal: Optimal Comfort and Wellbeing  Outcome: Ongoing, Progressing     Problem: Skin Injury Risk Increased  Goal: Skin Health and Integrity  Outcome: Ongoing, Progressing     Problem: Fall Injury Risk  Goal: Absence of Fall and Fall-Related Injury  Outcome: Ongoing, Progressing

## 2022-10-01 NOTE — PLAN OF CARE
Problem: Adult Inpatient Plan of Care  Goal: Plan of Care Review  10/1/2022 1100 by Tim Hubbard RN  Outcome: Ongoing, Progressing  10/1/2022 0745 by Tim Hubbard RN  Outcome: Ongoing, Progressing  Goal: Patient-Specific Goal (Individualized)  10/1/2022 1100 by Tim Hubbard RN  Outcome: Ongoing, Progressing  10/1/2022 0745 by Tim Hubbard RN  Outcome: Ongoing, Progressing  Goal: Absence of Hospital-Acquired Illness or Injury  10/1/2022 1100 by Tim Hubbard RN  Outcome: Ongoing, Progressing  10/1/2022 0745 by Tim Hubbard RN  Outcome: Ongoing, Progressing  Goal: Optimal Comfort and Wellbeing  10/1/2022 1100 by Tim Hubbard RN  Outcome: Ongoing, Progressing  10/1/2022 0745 by Tim Hubbard RN  Outcome: Ongoing, Progressing     Problem: Skin Injury Risk Increased  Goal: Skin Health and Integrity  10/1/2022 1100 by Tim Hubbard RN  Outcome: Ongoing, Progressing  10/1/2022 0745 by Tim Hubbard RN  Outcome: Ongoing, Progressing     Problem: Fall Injury Risk  Goal: Absence of Fall and Fall-Related Injury  10/1/2022 1100 by Tim Hubbard RN  Outcome: Ongoing, Progressing  10/1/2022 0745 by Tim Hubbard RN  Outcome: Ongoing, Progressing

## 2022-10-01 NOTE — NURSING
Pt blood sugar finger stick was 64 pt alert and oriented orange given and pt also eat pudding blood sugar rechecked and is 105. No acute distress noted

## 2022-10-01 NOTE — PROGRESS NOTES
[facility] Hospital Medicine Progress Note        Chief Complaint: Inpatient Follow-up for FTT    HPI:   Ricardo Cristobal is a 72 y.o. male with a past medical history of seizure disorder, osteoporosis, BPH, GERD, vitamin-D deficiency, and depression presented to [facility] on 9/28/2022 for weakness and failure to thrive.  Patient reports worsening generalized weakness for the past month.  Patient states intermittent constipation and diarrhea.  Patient reports he uses a wheelchair at home.  Patient reports he normally stands in the shower, but this week he has had to sit in the shower. Patient lives at home and has family members check on him twice a week.  Brother-in-law states that for the past month patient has been unable to go on outings secondary to weakness. Brother-in-law also reports that patient has had to call EMS several times in the past month to help him back into his wheelchair.  Family members report that they have been trying to get patient placement.    Patient also reports decreased appetite, nausea, and intermittent constipation/diarrhea.  Patient reports he 3 meals a day, sometimes only small bites of food.  Last bowel movement was 5 days ago.     In ED initial vital signs included blood pressure 110/90, pulse 70, respirations 18, temperature 36° C, and SpO2 98%.  Labs revealed RBC of 4.21 hemoglobin 13.1, hematocrit 38.2, sodium of 128, glucose 68, and troponin 0.011.  EKG showed normal sinus rhythm with rate of 66.Patient was admitted to hospital medicine service for further medical management of failure to thrive.    Interval Hx:     No acute events overnight.  Hemodynamically stable this morning.  Comfortably resting.  Has no new complaints or concerns.  Reports being tired and scared of staying in the hospital and wants to be discharged.  Tolerating p.o..    Labs revealing stable hemoglobin and platelets, improving sodium, mild hypophosphatemia      Objective/physical  exam:  Vitals:    09/30/22 2001 10/01/22 0035 10/01/22 0455 10/01/22 0744   BP: (!) 146/79 133/69 122/68 130/73   BP Location: Left arm      Patient Position: Lying      Pulse: 72 66 60 88   Resp: 18 18 18 16   Temp: 97.8 °F (36.6 °C) 97.8 °F (36.6 °C) 97.6 °F (36.4 °C)    TempSrc: Oral Oral Oral    SpO2: 98% 98% 98% 99%   Weight:       Height:         General:  Frail looking, comfortable, not in distress   Respiratory:  Poor inspiratory effort   Cardiovascular: S1, S2, no appreciable murmur  Abdomen: Soft, nontender, BS +  MSK: Warm, no lower extremity edema, no clubbing or cyanosis  Neurologic: Alert and oriented x4, moving all extremities with good strength     Lab Results   Component Value Date     (L) 10/01/2022    K 3.6 10/01/2022    CO2 30 10/01/2022    BUN 11.4 10/01/2022    CREATININE 0.52 (L) 10/01/2022    CALCIUM 7.9 (L) 10/01/2022    EGFRNONAA >60 08/24/2021      Lab Results   Component Value Date    ALT 24 09/30/2022    AST 30 09/30/2022    ALKPHOS 102 09/30/2022    BILITOT 0.5 09/30/2022      Lab Results   Component Value Date    WBC 4.3 (L) 10/01/2022    HGB 12.0 (L) 10/01/2022    HCT 34.7 (L) 10/01/2022    MCV 91.3 10/01/2022     10/01/2022           Medications:   enoxaparin  40 mg Subcutaneous Daily    ergocalciferol  50,000 Units Oral Q7 Days    famotidine  20 mg Oral Daily    ferric gluconate (FERRLECIT) IVPB  125 mg Intravenous Daily    lamoTRIgine  150 mg Oral BID    megestroL  200 mg Oral BID    tamsulosin  0.4 mg Oral Daily      acetaminophen, acetaminophen, dextrose 10%, dextrose 10%, glucagon (human recombinant), glucose, glucose, hydrALAZINE, melatonin, ondansetron     Assessment/Plan:    Adult failure to thrive  Generalized weakness, cachexia  Hypovolemic hyponatremia - improving    HX:  Depression, vitamin-D deficiency, osteoporosis, BPH, GERD    Plan:   -continue IV hydration.  Encourage p.o. intake.  Monitor sodium.    -dietary supplements.  Megace   -PT OT as tolerated    - IV iron for deficiency, switch to PO later  -other home medications reviewed     PT  Lovenox 40  pepcid  Labs  Full code    Fermin Bah MD

## 2022-10-02 NOTE — PROGRESS NOTES
[facility] Hospital Medicine Progress Note        Chief Complaint: Inpatient Follow-up for FTT    HPI:   Ricardo Cristobal is a 72 y.o. male with a past medical history of seizure disorder, osteoporosis, BPH, GERD, vitamin-D deficiency, and depression presented to [facility] on 9/28/2022 for weakness and failure to thrive.  Patient reports worsening generalized weakness for the past month.  Patient states intermittent constipation and diarrhea.  Patient reports he uses a wheelchair at home.  Patient reports he normally stands in the shower, but this week he has had to sit in the shower. Patient lives at home and has family members check on him twice a week.  Brother-in-law states that for the past month patient has been unable to go on outings secondary to weakness. Brother-in-law also reports that patient has had to call EMS several times in the past month to help him back into his wheelchair.  Family members report that they have been trying to get patient placement.    Patient also reports decreased appetite, nausea, and intermittent constipation/diarrhea.  Patient reports he 3 meals a day, sometimes only small bites of food.  Last bowel movement was 5 days ago.     In ED initial vital signs included blood pressure 110/90, pulse 70, respirations 18, temperature 36° C, and SpO2 98%.  Labs revealed RBC of 4.21 hemoglobin 13.1, hematocrit 38.2, sodium of 128, glucose 68, and troponin 0.011.  EKG showed normal sinus rhythm with rate of 66.Patient was admitted to hospital medicine service for further medical management of failure to thrive.    Interval Hx:     No new issues overnight.  Hemoglobin stable.  Comfortably resting in the bed.  Tolerating diet.      Objective/physical exam:  Vitals:    10/01/22 1300 10/01/22 1700 10/01/22 2003 10/01/22 2300   BP: 119/78 120/80 121/65 127/67   BP Location: Left arm      Patient Position: Sitting      Pulse: 92 105 92 72   Resp: 18 18 20 18   Temp: 98.8  °F (37.1 °C) 98.3 °F (36.8 °C) 98.4 °F (36.9 °C) 98.1 °F (36.7 °C)   TempSrc: Oral  Oral    SpO2: 98% 98% 100% 97%   Weight:       Height:         General:  Frail looking, comfortable, not in distress   Respiratory:  Poor inspiratory effort   Cardiovascular: S1, S2, no appreciable murmur  Abdomen: Soft, nontender, BS +  MSK: Warm, no lower extremity edema, no clubbing or cyanosis  Neurologic: Alert and oriented x4, moving all extremities with good strength     Lab Results   Component Value Date     (L) 10/01/2022    K 3.6 10/01/2022    CO2 30 10/01/2022    BUN 11.4 10/01/2022    CREATININE 0.52 (L) 10/01/2022    CALCIUM 7.9 (L) 10/01/2022    EGFRNONAA >60 08/24/2021      Lab Results   Component Value Date    ALT 24 09/30/2022    AST 30 09/30/2022    ALKPHOS 102 09/30/2022    BILITOT 0.5 09/30/2022      Lab Results   Component Value Date    WBC 4.3 (L) 10/01/2022    HGB 12.0 (L) 10/01/2022    HCT 34.7 (L) 10/01/2022    MCV 91.3 10/01/2022     10/01/2022           Medications:   enoxaparin  40 mg Subcutaneous Daily    ergocalciferol  50,000 Units Oral Q7 Days    famotidine  20 mg Oral Daily    ferric gluconate (FERRLECIT) IVPB  125 mg Intravenous Daily    lamoTRIgine  150 mg Oral BID    megestroL  200 mg Oral BID    tamsulosin  0.4 mg Oral Daily      acetaminophen, acetaminophen, dextrose 10%, dextrose 10%, glucagon (human recombinant), glucose, glucose, hydrALAZINE, melatonin, ondansetron     Assessment/Plan:    Adult failure to thrive  Generalized weakness, cachexia  Hypovolemic hyponatremia - improving    HX:  Depression, vitamin-D deficiency, osteoporosis, BPH, GERD    Plan:   -continue IV hydration.  Encourage p.o. intake.  Monitor sodium.    -dietary supplements.  Megace   -PT OT as tolerated   - IV iron for deficiency, switch to PO later  -other home medications reviewed     PT  Lovenox 40  pepcid  Labs  Full code    Fermin Bah MD

## 2022-10-03 NOTE — PT/OT/SLP PROGRESS
Occupational Therapy  Treatment    Ricardo Cristobal   MRN: [id number]   Admitting Diagnosis: Failure to thrive in adult    OT Date of Treatment: 10/03/22   OT Start Time: 1429  OT Stop Time: 1547  OT Total Time (min): 78 min        OT/DEB: DEB     DEB Visit Number: 1    General Precautions: Standard, fall (high risk for acquired pressure ulcer; strict pressure ulcer prevention!)  Orthopedic Precautions:    Braces:           Subjective:  Communicated with RN prior to session.     Patient found in bed upon entry. Patient stating that he is very anxious being in this hospital and would like to get out.    Objective:       Functional Mobility:  Bed Mobility:   Supine to sit: Standby Assistance   Sit to supine: Standby Assistance    Transfer Training:   Sit to stand:Maximum Assistance with Rolling Walker from EOB with B LE buckling throughout t/f  Bed <> Chair:  Step Transfer with Maximum Assistance with Rolling Walker from EOB with B LE bucling throughout transfer.  Patient performing w/c mobility in hallway for 100 feet using B LE to propel down hallway. Patient able to spend time out side to increase self motivation and ease anxiety from being in hospital.    UE Dressing:  Patient performed UE Dressing with Total Assistance with therapist assist patient attempting one trial then becoming frustrated requiring therapist assist at Edge of bed.    LE Dressing:  Patient don/doffed socks with Total Assistance and requiring therapist to assist patient stating that he cannot bend forward to west/doff and Patient don/doffed shoes with Total Assistance and requiring therapist to assist patient stating that he cannot bend forward to west/doff.    Patient left HOB elevated with all lines intact, call button in reach, and RN notified    ASSESSMENT:  Ricardo Cristobal is a 72 y.o. male with a medical diagnosis of Failure to thrive in adult and presents with increased weakness 2/2 poor mobility and kyphotic posture. Patient very anxious  about not going home requiring conversation on assistance level. Patient unrealistic on self care despite education provided during session. Patient stating would like to go home. If patient would go home he would need 24 hour care-givers. Patient educated on this.    Rehab potential is poor    Activity tolerance: Poor    Discharge recommendations: nursing facility, basic, nursing facility, skilled     Equipment recommendations:       GOALS:   Multidisciplinary Problems       Occupational Therapy Goals          Problem: Occupational Therapy    Goal Priority Disciplines Outcome Interventions   Occupational Therapy Goal     OT, PT/OT Ongoing, Progressing    Description: ST. UB dressing SBA from w/c.  2. LB dressing Min A from w/c.  3. Toileting, bsc t/f SBA with RW.                       Plan:  Patient to be seen 5 x/week, 3 x/week to address the above listed problems via self-care/home management, therapeutic activities, wheelchair management/training  Plan of Care expires: 10/13/22  Plan of Care reviewed with: patient         10/03/2022

## 2022-10-03 NOTE — PROGRESS NOTES
[facility] Hospital Medicine Progress Note        Chief Complaint: Inpatient Follow-up for FTT    HPI:   Ricardo Cristobal is a 72 y.o. male with a past medical history of seizure disorder, osteoporosis, BPH, GERD, vitamin-D deficiency, and depression presented to [facility] on 9/28/2022 for weakness and failure to thrive.  Patient reports worsening generalized weakness for the past month.  Patient states intermittent constipation and diarrhea.  Patient reports he uses a wheelchair at home.  Patient reports he normally stands in the shower, but this week he has had to sit in the shower. Patient lives at home and has family members check on him twice a week.  Brother-in-law states that for the past month patient has been unable to go on outings secondary to weakness. Brother-in-law also reports that patient has had to call EMS several times in the past month to help him back into his wheelchair.  Family members report that they have been trying to get patient placement.    Patient also reports decreased appetite, nausea, and intermittent constipation/diarrhea.  Patient reports he 3 meals a day, sometimes only small bites of food.  Last bowel movement was 5 days ago.     In ED initial vital signs included blood pressure 110/90, pulse 70, respirations 18, temperature 36° C, and SpO2 98%.  Labs revealed RBC of 4.21 hemoglobin 13.1, hematocrit 38.2, sodium of 128, glucose 68, and troponin 0.011.  EKG showed normal sinus rhythm with rate of 66.Patient was admitted to hospital medicine service for further medical management of failure to thrive.    Interval Hx:     Hemodynamically stable.  Sodium stable .  Note insulin.  Comfortably resting in the bed.  Wants to be discharged soon.      Objective/physical exam:  Vitals:    10/02/22 1544 10/02/22 1952 10/02/22 2254 10/03/22 0400   BP: 133/76 (!) 144/82 130/75 126/82   BP Location: Left arm Left arm     Patient Position: Lying Lying     Pulse: 82 89 75  79   Resp: 18 18 18 20   Temp: 98.5 °F (36.9 °C) 97.7 °F (36.5 °C) 98.1 °F (36.7 °C) 97.9 °F (36.6 °C)   TempSrc: Oral Oral Oral    SpO2: 95% 96% 96% 97%   Weight:       Height:         General:  Frail looking, comfortable, not in distress   Respiratory:  Poor inspiratory effort   Cardiovascular: S1, S2, no appreciable murmur  Abdomen: Soft, nontender, BS +  MSK: Warm, no lower extremity edema, no clubbing or cyanosis  Neurologic: Alert and oriented x4, moving all extremities with good strength     Lab Results   Component Value Date     (L) 10/03/2022    K 4.0 10/03/2022    CO2 25 10/03/2022    BUN 7.0 (L) 10/03/2022    CREATININE 0.46 (L) 10/03/2022    CALCIUM 7.9 (L) 10/03/2022    EGFRNONAA >60 08/24/2021      Lab Results   Component Value Date    ALT 27 10/03/2022    AST 27 10/03/2022    ALKPHOS 122 10/03/2022    BILITOT 0.7 10/03/2022      Lab Results   Component Value Date    WBC 3.5 (L) 10/03/2022    HGB 11.2 (L) 10/03/2022    HCT 33.1 (L) 10/03/2022    MCV 91.4 10/03/2022     10/03/2022           Medications:   enoxaparin  40 mg Subcutaneous Daily    ergocalciferol  50,000 Units Oral Q7 Days    famotidine  20 mg Oral Daily    ferric gluconate (FERRLECIT) IVPB  125 mg Intravenous Daily    lamoTRIgine  150 mg Oral BID    megestroL  400 mg Oral BID    mirtazapine  15 mg Oral QHS    tamsulosin  0.4 mg Oral Daily      acetaminophen, acetaminophen, dextrose 10%, dextrose 10%, glucagon (human recombinant), glucose, glucose, hydrALAZINE, melatonin, ondansetron     Assessment/Plan:    Adult failure to thrive  Generalized weakness, cachexia  Hypovolemic hyponatremia - improving    HX:  Depression, vitamin-D deficiency, osteoporosis, BPH, GERD    Plan:   -dc IV hydration.  Encourage p.o. intake.  Monitor sodium.    -dietary supplements.  increase Megace . Add remeron 15mg qhs  -PT OT as tolerated   - IV iron for deficiency, switch to PO later  -other home medications reviewed     PT  Lovenox  40  pepcid  Labs  Full code    Fermin Bah MD

## 2022-10-03 NOTE — PLAN OF CARE
Spoke to Rosa with [facility] who states they are interested in accepting this pt and she is reaching out to pt's sister Tarsha.

## 2022-10-03 NOTE — PROGRESS NOTES
Initial visit, noting severly emaciated male with , very vulnerable bony prominences over upper back dressed with bordered foam dressing. He demonstrates ability to mobilize self for redistribution of pressure over his bony prominences and is resting on a low air loss bed with pressure injury measures in place.    10/03/22 0900   Peripheral Neurovascular   VTE Prevention/Management remove, assess skin, and reapply compression stockings        Altered Skin Integrity 10/03/22 0900 upper Thoracic spine Other (comment) Purple or maroon localized area of discolored intact skin or non-intact skin or a blood-filled blister.   Date First Assessed/Time First Assessed: 10/03/22 0900   Altered Skin Integrity Present on Admission: yes  Orientation: upper  Location: Thoracic spine  Is this injury device related?: No  Primary Wound Type: (c) Other (comment)  Description of Altere...   Wound Image    Description of Altered Skin Integrity Purple or maroon localized area of discolored intact skin or non-intact skin or a blood-filled blister.   Dressing Appearance Intact;Dry   Drainage Amount None   Appearance Maroon;Red   Red (%), Wound Tissue Color 90 %   Periwound Area Intact   Wound Edges Irregular;Undefined   Wound Length (cm) 2 cm   Wound Width (cm) 1 cm   Wound Surface Area (cm^2) 2 cm^2   Care Cleansed with:;Soap and water   Dressing Reinforced   Dressing Change Due 10/04/22

## 2022-10-04 NOTE — PLAN OF CARE
Problem: Adult Inpatient Plan of Care  Goal: Plan of Care Review  Outcome: Ongoing, Progressing  Flowsheets (Taken 10/4/2022 1049)  Plan of Care Reviewed With: patient  Goal: Patient-Specific Goal (Individualized)  Outcome: Ongoing, Progressing  Flowsheets (Taken 10/4/2022 1049)  Anxieties, Fears or Concerns: concerned about the facility that pt is going to be DC to  Individualized Care Needs: help with ADL's  Patient-Specific Goals (Include Timeframe): patient want to go home by the end of weekend  Goal: Absence of Hospital-Acquired Illness or Injury  Outcome: Ongoing, Progressing  Intervention: Identify and Manage Fall Risk  Flowsheets (Taken 10/4/2022 1049)  Safety Promotion/Fall Prevention:   assistive device/personal item within reach   nonskid shoes/socks when out of bed   medications reviewed   Fall Risk reviewed with patient/family  Intervention: Prevent Skin Injury  Flowsheets (Taken 10/4/2022 1049)  Body Position: position changed independently  Skin Protection:   adhesive use limited   incontinence pads utilized  Intervention: Prevent and Manage VTE (Venous Thromboembolism) Risk  Flowsheets (Taken 10/4/2022 1049)  Activity Management:   Arm raise - L1   Rolling - L1  VTE Prevention/Management:   remove, assess skin, and reapply sequential compression device   ROM (active) performed   ambulation promoted  Range of Motion: active ROM (range of motion) encouraged  Goal: Readiness for Transition of Care  Outcome: Ongoing, Progressing     Problem: Skin Injury Risk Increased  Goal: Skin Health and Integrity  Outcome: Ongoing, Progressing     Problem: Fall Injury Risk  Goal: Absence of Fall and Fall-Related Injury  Outcome: Ongoing, Progressing     Problem: Impaired Wound Healing  Goal: Optimal Wound Healing  Outcome: Ongoing, Progressing

## 2022-10-04 NOTE — DISCHARGE SUMMARY
[facility] - 5th Floor [facility] Medicine  Discharge Summary      Patient Name: Ricardo Cristobal  MRN: [id number]  Patient Class: IP- Inpatient  Admission Date: 9/28/2022  Hospital Length of Stay: 6 days  Discharge Date and Time:  10/04/2022 7:45 AM  Attending Physician: Bryson Tracy MD   Discharging Provider: Fermin Bah MD  Primary Care Provider: Peng Russell Jr, MD        Ricardo Cristobal is a 72 y.o. male with a past medical history of seizure disorder, osteoporosis, BPH, GERD, vitamin-D deficiency, and depression presented to [facility] on 9/28/2022 for weakness and failure to thrive.  Patient reports worsening generalized weakness for the past month.  Patient states intermittent constipation and diarrhea.  Patient reports he uses a wheelchair at home.  Patient reports he normally stands in the shower, but this week he has had to sit in the shower. Patient lives at home and has family members check on him twice a week.  Brother-in-law states that for the past month patient has been unable to go on outings secondary to weakness. Brother-in-law also reports that patient has had to call EMS several times in the past month to help him back into his wheelchair.  Family members report that they have been trying to get patient placement.  Patient also reports decreased appetite, nausea, and intermittent constipation/diarrhea.  Patient reports he 3 meals a day, sometimes only small bites of food.  Last bowel movement was 5 days ago.     In ED initial vital signs included blood pressure 110/90, pulse 70, respirations 18, temperature 36° C, and SpO2 98%.  Labs revealed RBC of 4.21 hemoglobin 13.1, hematocrit 38.2, sodium of 128, glucose 68, and troponin 0.011.  EKG showed normal sinus rhythm with rate of 66.Patient was admitted to hospital medicine service for further medical management of failure to thrive.    IV hydration was continued.  P.o. diet was encouraged and dietary supplements were  added.  His electrolytes improved gradually, tolerated PT and currently medically stable for discharge to nursing home.  Prior to discharge all his medications were reconciled, necessary prescriptions were provided.  Megace and Remeron were added.  He also received IV iron due to iron deficiency and will continue p.o. iron upon DC.    Adult failure to thrive  Generalized weakness, cachexia  Hypovolemic hyponatremia - improving  Iron deficiency    HX:  Depression, vitamin-D deficiency, osteoporosis, BPH, GERD      Goals of Care Treatment Preferences:  Code Status: Full Code      Consults:   Consults (From admission, onward)          Status Ordering Provider     Inpatient consult to Social Work/Case Management  Once        Provider:  (Not yet assigned)    Completed MINERVA SIMMONS            No new Assessment & Plan notes have been filed under this hospital service since the last note was generated.  Service: Hospital Medicine    Final Active Diagnoses:    Diagnosis Date Noted POA    PRINCIPAL PROBLEM:  Failure to thrive in adult [R62.7] 09/28/2022 Yes      Problems Resolved During this Admission:       Discharged Condition: good    Disposition: Skilled Nursing Facility    Follow Up:   Follow-up Information       Peng Russell Jr, MD Follow up in 3 week(s).    Specialty: Family Medicine  Contact information:  [address] [place] [address] [id number] [phone]                           Patient Instructions:   No discharge procedures on file.    Significant Diagnostic Studies: Labs: CMP   Recent Labs   Lab 10/03/22  0406   *   K 4.0   CO2 25   BUN 7.0*   CREATININE 0.46*   CALCIUM 7.9*   ALBUMIN 2.7*   BILITOT 0.7   ALKPHOS 122   AST 27   ALT 27       Pending Diagnostic Studies:       Procedure Component Value Units Date/Time    COVID-19 Rapid Screening [[id number]] Collected: 10/04/22 1000    Order Status: Sent Lab Status: In process Updated: 10/04/22 1005    Specimen: Nasal Swab     Creatinine, Random Urine  [[id number]]     Order Status: Sent Lab Status: No result     Specimen: Urine     Osmolality, Urine [[id number]]     Order Status: Sent Lab Status: No result     Specimen: Urine     Sodium, Random Urine [[id number]]     Order Status: Sent Lab Status: No result     Specimen: Urine            Medications:  Reconciled Home Medications:      Medication List        START taking these medications      famotidine 20 MG tablet  Commonly known as: PEPCID  Take 1 tablet (20 mg total) by mouth once daily.     ferrous sulfate 325 (65 FE) MG EC tablet  Take 1 tablet (325 mg total) by mouth once daily.     megestroL 400 mg/10 mL (10 mL) Susp  Commonly known as: MEGACE  Take 10 mLs (400 mg total) by mouth 2 (two) times daily.            CHANGE how you take these medications      ergocalciferol 50,000 unit Cap  Commonly known as: ERGOCALCIFEROL  Take 1 capsule (50,000 Units total) by mouth every 7 days. for 12 doses  Start taking on: October 6, 2022  What changed:   how much to take  when to take this     gabapentin 100 MG capsule  Commonly known as: NEURONTIN  Take 2 capsules (200 mg total) by mouth every evening.  What changed:   medication strength  how much to take     melatonin 3 mg tablet  Commonly known as: MELATIN  Take 2 tablets (6 mg total) by mouth nightly as needed for Insomnia.  What changed:   medication strength  how much to take  when to take this  reasons to take this     mirtazapine 15 MG tablet  Commonly known as: REMERON  Take 1 tablet (15 mg total) by mouth every evening.  What changed:   how much to take  how to take this  when to take this     tamsulosin 0.4 mg Cap  Commonly known as: FLOMAX  Take 1 capsule (0.4 mg total) by mouth once daily.  What changed:   how much to take  how to take this  when to take this            CONTINUE taking these medications      alendronate 70 MG tablet  Commonly known as: FOSAMAX  See Instructions, TAKE 1 TABLET EVERY WEEK, # 12 tab(s), 3 Refill(s), Pharmacy: [facility]  "HOME DELIVERY, 162, cm, Height/Length Dosing, 01/17/22 13:50:00 CST, 48.9, kg, Weight Dosing, 01/17/22 13:50:00 CST     lamoTRIgine 150 MG Tab  Commonly known as: LAMICTAL  See Instructions, TAKE 1 TABLET TWICE DAILY, # 60 tab(s), 0 Refill(s), Pharmacy: [facility]/pharmacy #[id number], 162, cm, Height/Length Dosing, 02/24/21 15:20:00 CST, 51.4, kg, Weight Dosing, 02/24/21 15:20:00 CST     latanoprost 0.005 % ophthalmic solution     mupirocin 2 % ointment  Commonly known as: BACTROBAN  Apply topically 3 (three) times daily.     ONE DAILY MULTIVITAMIN per tablet  Generic drug: multivitamin  Take 1 tablet by mouth once daily.            STOP taking these medications      BD HERIBERTO 2ND GEN PEN NEEDLE 32 gauge x 5/32" Ndle  Generic drug: pen needle, diabetic     calcium carbonate 600 mg calcium (1,500 mg) Tab  Commonly known as: OS-HENRI     OXcarbazepine 300 MG Tab  Commonly known as: TRILEPTAL     pantoprazole 40 MG tablet  Commonly known as: PROTONIX              Indwelling Lines/Drains at time of discharge:   Lines/Drains/Airways       None                   Time spent on the discharge of patient: 35 minutes         Fermin Bah MD  Department of Hospital Medicine  [facility] - 5th Floor Med Surg  "

## 2022-10-04 NOTE — PROGRESS NOTES
Inpatient Nutrition Assessment    Admit Date: 9/28/2022   Total duration of encounter: 5 days     Nutrition Recommendation/Prescription     Encourage small and frequent high calorie/protein meals as tolerated.  Continue vanilla Boost (provides 240 kcal, 10 g protein per serving).  Antacid and antiemetics as medically feasible.  Continue appetite stimulant     Communication of Recommendations: reviewed with patient/caregiver    Nutrition Assessment     Malnutrition Assessment/Nutrition-Focused Physical Exam    Malnutrition in the context of chronic illness  Degree of Malnutrition: severe malnutrition  Energy Intake: < 75% of estimated energy requirement for >/= 1 month  Interpretation of Weight Loss: >7.5% in 3 months  Body Fat: severe depletion  Area of Body Fat Loss: orbital region , upper arm region - triceps / biceps, and thoracic and lumbar region - ribs, lower back, midaxillary line  Muscle Mass Loss: severe depletion  Area of Muscle Mass Loss: temple region - temporalis muscle, clavicle bone region - pectoralis major, deltoid, trapezius muscles, clavicle and acromion bone region - deltoid muscle, and scapular bone region - trapezius, supraspinus, infraspinus muscles  Fluid Accumulation: does not meet criteria  Edema: does not meet criteria  Reduced  Strength: unable to obtain  A minimum of two characteristics is recommended for diagnosis of either severe or non-severe malnutrition.    Chart Review    Reason Seen: continuous nutrition monitoring    Diagnosis:  Adult failure to thrive  Generalized weakness, cachexia  Hypovolemic hyponatremia    Relevant Medical History: seizure disorder, osteoporosis, BPH, GERD, vitamin-D deficiency, and depression     Nutrition-Related Medications: D2, Megace, Ferric Gluconate   Calorie Containing IV Medications: no significant kcals from medications at this time    Nutrition-Related Labs:  9/29/22 Na 128, Cl 96, Ca 8.3, Total pro 4.9, Alb 2.9  10/3/22 WBC 3.5, Na 131, BUN  "7.0, Cr 0.46, Glu 67, Ca 7.9, Alb 2.7     Diet/PN Order: Diet heart healthy  Oral Supplement Order: Boost Plus  Tube Feeding Order: none at this time  Appetite/Oral Intake: fair/25-50% of meals  Factors Affecting Nutritional Intake: constipation, decreased appetite, early satiety, nausea, and reflux  Food/Pent[employer]al/Cultural Preferences: none reported    Skin Integrity: abrasion  Wound(s):   none documented    Comments    22 Pt ate about 50% breakfast. States eats small meals; with early satiety and reflux. Requesting to change Boost Plus to Boost original for better tolerance. Reports weight was 130lbs 3 years ago and has dropped to 100lbs within the last year and within the last 3 months dropped to 90lbs (10% wt loss x3 months). With severe muscle and fat loss.     10/3/22 Pt reports eating ~50% then his phone rang and he asked me to leave so he could take the call.     Anthropometrics    Height: 5' 4" (162.6 cm)    Last Weight: 40.8 kg (90 lb) (22 1044)    BMI (Calculated): 15.4  BMI Classification: underweight (BMI less than 18.5)        Ideal Body Weight (IBW), Male: 130 lb     % Ideal Body Weight, Male (lb): 69.23 %                 Usual Body Weight (UBW), k.45 kg  % Usual Body Weight: 90.01     Usual Weight Provided By: patient    Wt Readings from Last 5 Encounters:   22 40.8 kg (90 lb)   22 45.4 kg (100 lb)   22 41 kg (90 lb 6.2 oz)   22 48.5 kg (107 lb)   22 48.9 kg (107 lb 12.9 oz)     Weight Change(s) Since Admission:  Admit Weight: 40.8 kg (90 lb) (22 1044)  22 40.8kg    Estimated Needs    Weight Used For Calorie Calculations: 40.8 kg (89 lb 15.2 oz)  Energy Calorie Requirements (kcal): 1428-1632kcals/d (35-40kcals/kg)  Energy Need Method: Kcal/kg  Weight Used For Protein Calculations: 40.8 kg (89 lb 15.2 oz)  Protein Requirements: 61g/d (1.5g/kg)  Fluid Requirements (mL): 1428fl/d (1ml/kcal)  Temp: 98.3 °F (36.8 °C)       Enteral " Nutrition    Patient not receiving enteral nutrition at this time.    Parenteral Nutrition    Patient not receiving parenteral nutrition support at this time.    Evaluation of Received Nutrient Intake    Calories: not meeting estimated needs  Protein: not meeting estimated needs    Patient Education    Not applicable.    Nutrition Diagnosis     PES: Malnutrition related to multiple medical conditions as evidenced by <75% EER >/=1 month, >7.5% wt loss x3 months, underweight BMI (15.45), severe muscle and fat loss. (new)    Interventions/Goals     Intervention(s): general/healthful diet, commercial beverage, prescription medication, and collaboration with other providers  Goal: Meet greater than 75% of nutritional needs by follow-up. (new)    Monitoring & Evaluation     Dietitian will monitor energy intake, weight change, electrolyte/renal panel, and glucose/endocrine profile.  Nutrition Risk/Follow-Up: high (follow-up in 1-4 days)

## 2022-10-04 NOTE — PHYSICIAN QUERY
PT Name: Ricardo Cristobal  MR #: [id number]    DOCUMENTATION CLARIFICATION     CDS/: Dinesh Guy RN            Contact information: [email or web address]    This form is a permanent document in the medical record.     Query Date: October 4, 2022    By submitting this query, we are merely seeking further clarification of documentation.. Please utilize your independent clinical judgment when addressing the question(s) below.    The medical record contains the following:     Indicators  Supporting Clinical Findings Location in Medical Record   x Energy Intake < 75% of estimated energy requirement for >/= 1 month    RD consult (09/29)   x Weight Loss >7.5% in 3 months    RD consult (09/29)   x Fat Loss Body Fat: severe depletion    Area of Body Fat Loss: orbital region , upper arm region - triceps / biceps, and thoracic and lumbar region - ribs, lower back, midaxillary line    RD consult (09/29)   x Muscle Loss Muscle Mass Loss: severe depletion    Area of Muscle Mass Loss: temple region - temporalis muscle, clavicle bone region - pectoralis major, deltoid, trapezius muscles, clavicle and acromion bone region - deltoid muscle, and scapular bone region - trapezius, supraspinus, infraspinus muscles    RD consult (09/29)    Edema/Fluid Accumulation      Reduced  Strength (by dynamometer)     x Weight, BMI, Usual Body Weight BMI (Calculated): 15.4    BMI Classification: underweight (BMI less than 18.5)    RD consult (09/29)    Delayed Wound Healing     x Registered Dietician Diagnosis Degree of Malnutrition: severe malnutrition    RD consult (09/29)   x Acute or Chronic Illness Malnutrition in the context of chronic illness    Significantly deconditioned and malnutrition.    RD consult (09/29)     HM PN (09/29)    Social or Environmental Circumstances     x Treatment dietary supplements.  increase Megace . Add remeron 15mg qhs    HM PN (10/03)    Other         Academy of Nutrition and Dietetics (Academy) and the  American Society for Parenteral and Enteral Nutrition (A.S.P.E.N.) Clinical Characteristics to support Malnutrition   Malnutrition in the Context of Acute Illness or Injury Malnutrition in the Context of Chronic Illness or Injury Malnutrition in the Context of Social or Environmental Circumstances   Malnutrition Level Moderate Severe Moderate Severe   Moderate   Severe   Energy Intake <75%                   >7 days <50%                 >5 days <75%           >1 month <75%                      >1 month   <75% for >3 months   <50% for >1 month   Weight Loss   1-2% in 1 week >2% in 1 week 5% in 1 month >5% in 1 month 5% in 1 month >5% in 1 month    5% in 1 month >5% in 1 month 7.5% in 3 months >7.5% in 3 months 7.5% in 3 months >7.5% in 3 months    7.5% in 3 months >7.5% in 3 months 10% in 6 months >10% in 6 months 10% in 6 months >10% in 6 months        20% in 1 year                    >20% in 1 year                                                                  20% in 1 year                            >20% in 1 year                                                  Subcutaneous Fat Loss Mild  Moderate  Mild  Severe    Mild   Severe   Muscle Loss Mild depletion Moderate depletion  Mild depletion Severe Depletion   Mild   Severe   Edema/Fluid Accumulation Mild Moderate to severe  Mild  Severe   Mild   Severe   Reduced  Strength         (based on standards supplied by  of dynamometer) N/A Measurably reduced N/A Measurably reduced N/A Measurably reduced     Criteria for mild malnutrition is defined as 1 characteristic outlined above within the established moderate or severe parameters.  A minimum of 2 out of the 6 characteristics noted above are recommended for a diagnosis of moderate or severe malnutrition.  Chronic illness/injury is a disease/condition lasting 3 months or longer.    The noted clinical guidelines are only system guidelines and do not replace the providers clinical  judgment.    Provider, please further specify the malnutrition diagnosis associated with above clinical findings.    [  ] Severe Malnutrition - a minimum of 2 of the 6 severe malnutrition characteristics noted above      [  ] Malnutrition, other degree,  (please specify): _______     [  ] Malnutrition, Unspecified degree     [  ] Other Nutritional Diagnosis (please specify): _______     [ x ] Clinically Undetermined       Please document in your progress notes daily for the duration of treatment until resolved and  include in your discharge summary.      References:    LEE Alejandro, & CHERYLE Ho (2022, April). Assessment and management of anorexia and cachexia in palliative care. Retrieved May 23, 2022, from [email or web address]-[email or web address]-management-[email or web address]-[email or web address]-cachexia-[email or web address]-[email or web address]-care?[email or web address]     IJEOMA Yee, PhD, RD, Kari PATRICK P., PhD, RN, CAMI Ortiz MD, PhD, Glenn ADAMS A., MS, RD, [facility], CHIDI Travis, MS, RD, The Academy Malnutrition Work Group, The A.S.P.E.N. Board of Directors. (2012). Consensus Statement: Academy of Nutrition and Dietetics and American Society for Parenteral and Enteral Nutrition: Characteristics Recommended for the Identification and Documentation of Adult Malnutrition (Undernutrition). Journal of Parenteral and Enteral Nutrition, 36(3), 275-283. doi:10.1177/2623110669652109     Form No. 74930

## 2022-10-04 NOTE — PLAN OF CARE
10/04/22 0938   Medicare Message   Important Message from Medicare regarding Discharge Appeal Rights Given to patient/caregiver;Explained to patient/caregiver

## 2023-01-01 ENCOUNTER — PATIENT OUTREACH (OUTPATIENT)
Dept: ADMINISTRATIVE | Facility: CLINIC | Age: 73
End: 2023-01-01
Payer: MEDICARE

## 2023-01-01 ENCOUNTER — EXTERNAL HOSPITAL ADMISSION (OUTPATIENT)
Dept: ADMINISTRATIVE | Facility: CLINIC | Age: 73
End: 2023-01-01
Payer: MEDICARE

## 2023-01-01 PROCEDURE — 82746 ASSAY OF FOLIC ACID SERUM: CPT | Performed by: FAMILY MEDICINE

## 2023-01-01 PROCEDURE — 82607 VITAMIN B-12: CPT | Performed by: FAMILY MEDICINE
